# Patient Record
Sex: FEMALE | Race: BLACK OR AFRICAN AMERICAN | NOT HISPANIC OR LATINO | Employment: UNEMPLOYED | URBAN - METROPOLITAN AREA
[De-identification: names, ages, dates, MRNs, and addresses within clinical notes are randomized per-mention and may not be internally consistent; named-entity substitution may affect disease eponyms.]

---

## 2020-01-06 ENCOUNTER — OFFICE VISIT (OUTPATIENT)
Dept: FAMILY MEDICINE CLINIC | Facility: CLINIC | Age: 45
End: 2020-01-06
Payer: COMMERCIAL

## 2020-01-06 VITALS
HEART RATE: 81 BPM | SYSTOLIC BLOOD PRESSURE: 158 MMHG | OXYGEN SATURATION: 100 % | BODY MASS INDEX: 19.44 KG/M2 | DIASTOLIC BLOOD PRESSURE: 94 MMHG | HEIGHT: 66 IN | WEIGHT: 121 LBS

## 2020-01-06 DIAGNOSIS — Z72.0 NICOTINE ABUSE: ICD-10-CM

## 2020-01-06 DIAGNOSIS — Z82.49 FAMILY HISTORY OF MI (MYOCARDIAL INFARCTION): ICD-10-CM

## 2020-01-06 DIAGNOSIS — Z23 NEED FOR INFLUENZA VACCINATION: ICD-10-CM

## 2020-01-06 DIAGNOSIS — Z76.89 ENCOUNTER TO ESTABLISH CARE WITH NEW DOCTOR: Primary | ICD-10-CM

## 2020-01-06 DIAGNOSIS — E28.319 EARLY MENOPAUSE OCCURRING IN PATIENT AGE YOUNGER THAN 45 YEARS: ICD-10-CM

## 2020-01-06 DIAGNOSIS — Z80.0 FAMILY HISTORY OF COLON CANCER REQUIRING SCREENING COLONOSCOPY: ICD-10-CM

## 2020-01-06 DIAGNOSIS — R03.0 ELEVATED BLOOD PRESSURE READING: ICD-10-CM

## 2020-01-06 DIAGNOSIS — Z80.3 FAMILY HISTORY OF BREAST CANCER: ICD-10-CM

## 2020-01-06 PROCEDURE — 90682 RIV4 VACC RECOMBINANT DNA IM: CPT | Performed by: FAMILY MEDICINE

## 2020-01-06 PROCEDURE — 90471 IMMUNIZATION ADMIN: CPT | Performed by: FAMILY MEDICINE

## 2020-01-06 PROCEDURE — 99203 OFFICE O/P NEW LOW 30 MIN: CPT | Performed by: FAMILY MEDICINE

## 2020-01-07 NOTE — PROGRESS NOTES
Assessment/Plan:  1  Encounter to establish care with new doctor  Hemoglobin A1C    TSH + Free T4    Hemoglobin A1C    TSH + Free T4   2  Family history of colon cancer requiring screening colonoscopy  Ambulatory referral to Gastroenterology   3  Family history of MI (myocardial infarction)  CBC and differential    Comprehensive metabolic panel    Hemoglobin A1C    Lipid panel    CBC and differential    Comprehensive metabolic panel    Hemoglobin A1C    Lipid panel   4  Elevated blood pressure reading  Comprehensive metabolic panel    Hemoglobin A1C    TSH + Free T4    Lipid panel    Comprehensive metabolic panel    Hemoglobin A1C    TSH + Free T4    Lipid panel   5  Early menopause occurring in patient age younger than 39 years     10  Nicotine abuse  nicotine (NICODERM CQ) 7 mg/24hr TD 24 hr patch   7  Family history of breast cancer       Establishment of new care  - RTO in 1 year for yearly physical  -CBC, CMP, lipid, TSH, A1c, future  - flu shot today    Elevated blood pressure reading  - /94 in office today  - RTO in 2 weeks for repeat BP measurement  - consider AA-friendly medication such as HCTZ or CCB  - CMP, lipid panel, TSH, future    Family history of colon cancer  - mother diagnosed with colon cancer in her 45s, harris currently reports no related symptoms including hematochezia or melena or unintentional weight loss  -referral to GI for colonoscopy    Nicotine abuse  -Nicoderm 7mg 24hr patch prescribed  - smoking cessation education provided including risks associated    Early menopause  -started last year when she was 38 y/o    Family history of breast cancer  - continue with self breast exams monthly  - will speak to Heme/Onc for genetic screening      Subjective:      Patient ID: Giselle Medina is a 40 y o  female  HPI  Patient is a 39 y/o female with history of early menopause presenting to establish care   Patient was recently seen in the office for the care of her mother and herself was noted to have a significantly elevated BP with a systolic in the 775T  She states that she was told to go to the ER but did not and instead made a decision to establish care at [facility] today  Patient currently denies any headache, dizziness, vision changes, chest pain, palpitations, SOB, abdominal pain, or unintentional weight loss  She admits that she is interested in quitting smoking  Per patient, pap smear was normal 3 years ago per patient  Performs monthly self breast exams  Stopped menses a year ago and states that she was having hot flashes and was told by [facility] that she has menopause  [address]: mother: COPD, HTN, DM, CAD, asthma, hx of colon cancer (her mother was in her 45s), sister passed away due to breast CA, father death from MI  Social: 1 pack per week for 22 years  Occasional EtOH use  Denies use of illicit drugs  Hospitalizations: none  Surgery: 3 C-sections    Review of Systems   Constitutional: Negative for chills and fever  Eyes: Negative for visual disturbance  Respiratory: Negative for chest tightness and shortness of breath  Cardiovascular: Negative for chest pain and palpitations  Gastrointestinal: Negative for abdominal pain, constipation, diarrhea and nausea  Neurological: Negative for headaches  Objective:      /94   Pulse 81   Ht 5' 5 5" (1 664 m)   Wt 54 9 kg (121 lb)   SpO2 100%   BMI 19 83 kg/m²          Physical Exam   Constitutional: She is oriented to person, place, and time  She appears well-developed and well-nourished  No distress  HENT:   Head: Normocephalic and atraumatic  Right Ear: External ear normal    Left Ear: External ear normal    Mouth/Throat: Oropharynx is clear and moist  No oropharyngeal exudate  Eyes: Pupils are equal, round, and reactive to light  Conjunctivae and EOM are normal    Neck: Normal range of motion  Neck supple  No tracheal deviation present     Cardiovascular: Normal rate, regular rhythm, normal heart sounds and intact distal pulses  Pulmonary/Chest: Effort normal and breath sounds normal  No respiratory distress  Right breast exhibits no inverted nipple, no mass, no nipple discharge, no skin change and no tenderness  Left breast exhibits no inverted nipple, no mass, no nipple discharge, no skin change and no tenderness  No breast swelling  Abdominal: Soft  Bowel sounds are normal  She exhibits no distension  There is no tenderness  There is no rebound and no guarding  Musculoskeletal: Normal range of motion  She exhibits no edema, tenderness or deformity  Lymphadenopathy:     She has no cervical adenopathy  Neurological: She is alert and oriented to person, place, and time  No cranial nerve deficit or sensory deficit  She exhibits normal muscle tone  Coordination normal    Skin: Skin is warm and dry  She is not diaphoretic  Psychiatric: She has a normal mood and affect  Her behavior is normal  Judgment and thought content normal    Nursing note and vitals reviewed  --  Tonya Daniel DO    "This note has been constructed using a voice recognition system  Therefore there may be syntax, spelling, and/or grammatical errors   Please call if you have any questions "

## 2020-01-20 ENCOUNTER — OFFICE VISIT (OUTPATIENT)
Dept: FAMILY MEDICINE CLINIC | Facility: CLINIC | Age: 45
End: 2020-01-20
Payer: COMMERCIAL

## 2020-01-20 VITALS
DIASTOLIC BLOOD PRESSURE: 100 MMHG | HEART RATE: 81 BPM | BODY MASS INDEX: 19.44 KG/M2 | SYSTOLIC BLOOD PRESSURE: 192 MMHG | RESPIRATION RATE: 18 BRPM | WEIGHT: 121 LBS | HEIGHT: 66 IN | OXYGEN SATURATION: 98 %

## 2020-01-20 DIAGNOSIS — Z23 NEED FOR TDAP VACCINATION: ICD-10-CM

## 2020-01-20 DIAGNOSIS — Z12.39 SCREENING FOR BREAST CANCER: ICD-10-CM

## 2020-01-20 DIAGNOSIS — I10 HYPERTENSION: Primary | ICD-10-CM

## 2020-01-20 PROCEDURE — 3080F DIAST BP >= 90 MM HG: CPT | Performed by: FAMILY MEDICINE

## 2020-01-20 PROCEDURE — 90471 IMMUNIZATION ADMIN: CPT | Performed by: FAMILY MEDICINE

## 2020-01-20 PROCEDURE — 90715 TDAP VACCINE 7 YRS/> IM: CPT | Performed by: FAMILY MEDICINE

## 2020-01-20 PROCEDURE — 3077F SYST BP >= 140 MM HG: CPT | Performed by: FAMILY MEDICINE

## 2020-01-20 PROCEDURE — 99213 OFFICE O/P EST LOW 20 MIN: CPT | Performed by: FAMILY MEDICINE

## 2020-01-20 PROCEDURE — 3008F BODY MASS INDEX DOCD: CPT | Performed by: FAMILY MEDICINE

## 2020-01-20 RX ORDER — AMLODIPINE BESYLATE 2.5 MG/1
2.5 TABLET ORAL DAILY
Qty: 30 TABLET | Refills: 3 | Status: SHIPPED | OUTPATIENT
Start: 2020-01-20 | End: 2022-05-19 | Stop reason: SDUPTHER

## 2020-01-21 NOTE — PROGRESS NOTES
Assessment/Plan:    1  Hypertension   - will start the patient on amlodipine 2 5 mg po q d    - patient advised on smoking cessation  - diet modification with low sodium content  - patient was counseled on the risks of having uncontrolled hypertension with concurrent smoking, including stroke and/or death  - patient advised to seek emergent care if any of the warning symptoms develop (chest pain, SOB, headache, vision changes, LOC or changes in mental status)    2  Screening for breast cancer  - referral for mammogram made    3  Need for Tdap booster  - booster administered today     RTO in 1 month  Subjective:      Patient ID: Akil Huffman is a 40 y o  female  HPI    Patient is a 40 y o  f with significant smokign history presenting for high blood pressure follow up  Two weeks ago the BP was 158/97 in office and today it is markedly elevated at 192/100  Patient currently denies any symptoms including headache, vision changes, chest pain, or shortness of breath  Patient admits to still smoking and states that she never picked up the nicotine patches that was prescribed during the previous visit  Patient also states that she eat diet very high in salt content  Patient describes a significant family history of hypertension and stroke in the first degree family members  Review of Systems    See above    Objective:      BP (!) 192/100   Pulse 81   Resp 18   Ht 5' 5 5" (1 664 m)   Wt 54 9 kg (121 lb)   SpO2 98%   BMI 19 83 kg/m²          Physical Exam   Constitutional: She appears well-developed and well-nourished  No distress  HENT:   Head: Normocephalic and atraumatic  Eyes: EOM are normal    Cardiovascular: Normal rate, regular rhythm, normal heart sounds and intact distal pulses  No murmur heard  Pulmonary/Chest: Effort normal and breath sounds normal  No respiratory distress  Abdominal: Soft  Bowel sounds are normal  There is no tenderness  Skin: Skin is warm and dry   She is not diaphoretic  Psychiatric: She has a normal mood and affect  Her behavior is normal  Thought content normal    Nursing note and vitals reviewed  --  Nancy Peguero,     "This note has been constructed using a voice recognition system  Therefore there may be syntax, spelling, and/or grammatical errors   Please call if you have any questions "

## 2020-01-26 LAB
ALBUMIN SERPL-MCNC: 4.8 G/DL (ref 3.8–4.8)
ALBUMIN/GLOB SERPL: 2 {RATIO} (ref 1.2–2.2)
ALP SERPL-CCNC: 92 IU/L (ref 39–117)
ALT SERPL-CCNC: 19 IU/L (ref 0–32)
AST SERPL-CCNC: 22 IU/L (ref 0–40)
BASOPHILS # BLD AUTO: 0.1 X10E3/UL (ref 0–0.2)
BASOPHILS NFR BLD AUTO: 1 %
BILIRUB SERPL-MCNC: 1.1 MG/DL (ref 0–1.2)
BUN SERPL-MCNC: 13 MG/DL (ref 6–24)
BUN/CREAT SERPL: 13 (ref 9–23)
CALCIUM SERPL-MCNC: 9.4 MG/DL (ref 8.7–10.2)
CHLORIDE SERPL-SCNC: 97 MMOL/L (ref 96–106)
CHOLEST SERPL-MCNC: 223 MG/DL (ref 100–199)
CHOLEST/HDLC SERPL: 1.9 RATIO (ref 0–4.4)
CO2 SERPL-SCNC: 22 MMOL/L (ref 20–29)
CREAT SERPL-MCNC: 0.97 MG/DL (ref 0.57–1)
EOSINOPHIL # BLD AUTO: 0.1 X10E3/UL (ref 0–0.4)
EOSINOPHIL NFR BLD AUTO: 1 %
ERYTHROCYTE [DISTWIDTH] IN BLOOD BY AUTOMATED COUNT: 12.7 % (ref 11.7–15.4)
EST. AVERAGE GLUCOSE BLD GHB EST-MCNC: 91 MG/DL
GLOBULIN SER-MCNC: 2.4 G/DL (ref 1.5–4.5)
GLUCOSE SERPL-MCNC: 88 MG/DL (ref 65–99)
HBA1C MFR BLD: 4.8 % (ref 4.8–5.6)
HCT VFR BLD AUTO: 46.2 % (ref 34–46.6)
HDLC SERPL-MCNC: 120 MG/DL
HGB BLD-MCNC: 15.6 G/DL (ref 11.1–15.9)
IMM GRANULOCYTES # BLD: 0 X10E3/UL (ref 0–0.1)
IMM GRANULOCYTES NFR BLD: 0 %
LDLC SERPL CALC-MCNC: 92 MG/DL (ref 0–99)
LYMPHOCYTES # BLD AUTO: 2 X10E3/UL (ref 0.7–3.1)
LYMPHOCYTES NFR BLD AUTO: 35 %
MCH RBC QN AUTO: 32.7 PG (ref 26.6–33)
MCHC RBC AUTO-ENTMCNC: 33.8 G/DL (ref 31.5–35.7)
MCV RBC AUTO: 97 FL (ref 79–97)
MONOCYTES # BLD AUTO: 0.7 X10E3/UL (ref 0.1–0.9)
MONOCYTES NFR BLD AUTO: 11 %
NEUTROPHILS # BLD AUTO: 3.1 X10E3/UL (ref 1.4–7)
NEUTROPHILS NFR BLD AUTO: 52 %
PLATELET # BLD AUTO: 272 X10E3/UL (ref 150–450)
POTASSIUM SERPL-SCNC: 4.2 MMOL/L (ref 3.5–5.2)
PROT SERPL-MCNC: 7.2 G/DL (ref 6–8.5)
RBC # BLD AUTO: 4.77 X10E6/UL (ref 3.77–5.28)
SL AMB EGFR AFRICAN AMERICAN: 82 ML/MIN/1.73
SL AMB EGFR NON AFRICAN AMERICAN: 71 ML/MIN/1.73
SL AMB VLDL CHOLESTEROL CALC: 11 MG/DL (ref 5–40)
SODIUM SERPL-SCNC: 139 MMOL/L (ref 134–144)
T4 FREE SERPL DIALY-MCNC: 1.1 NG/DL
TRIGL SERPL-MCNC: 53 MG/DL (ref 0–149)
TSH SERPL-ACNC: 0.82 UU/ML
WBC # BLD AUTO: 5.9 X10E3/UL (ref 3.4–10.8)

## 2020-01-26 PROCEDURE — 3044F HG A1C LEVEL LT 7.0%: CPT | Performed by: FAMILY MEDICINE

## 2021-08-03 ENCOUNTER — HOSPITAL ENCOUNTER (EMERGENCY)
Facility: HOSPITAL | Age: 46
Discharge: HOME | End: 2021-08-03
Attending: EMERGENCY MEDICINE | Admitting: EMERGENCY MEDICINE
Payer: COMMERCIAL

## 2021-08-03 VITALS
HEIGHT: 65 IN | HEART RATE: 85 BPM | DIASTOLIC BLOOD PRESSURE: 112 MMHG | OXYGEN SATURATION: 99 % | RESPIRATION RATE: 16 BRPM | WEIGHT: 107 LBS | TEMPERATURE: 98.6 F | BODY MASS INDEX: 17.83 KG/M2 | SYSTOLIC BLOOD PRESSURE: 219 MMHG

## 2021-08-03 DIAGNOSIS — T25.022A: Primary | ICD-10-CM

## 2021-08-03 DIAGNOSIS — T31.0 BURN (ANY DEGREE) INVOLVING LESS THAN 10% OF BODY SURFACE: ICD-10-CM

## 2021-08-03 DIAGNOSIS — I10 HYPERTENSION: ICD-10-CM

## 2021-08-03 PROCEDURE — 99283 EMERGENCY DEPT VISIT LOW MDM: CPT

## 2021-08-03 PROCEDURE — 99284 EMERGENCY DEPT VISIT MOD MDM: CPT | Performed by: PHYSICIAN ASSISTANT

## 2021-08-03 RX ORDER — GINSENG 100 MG
1 CAPSULE ORAL ONCE
Status: COMPLETED | OUTPATIENT
Start: 2021-08-03 | End: 2021-08-03

## 2021-08-03 RX ORDER — AMLODIPINE BESYLATE 5 MG/1
5 TABLET ORAL ONCE
Status: COMPLETED | OUTPATIENT
Start: 2021-08-03 | End: 2021-08-03

## 2021-08-03 RX ORDER — GINSENG 100 MG
1 CAPSULE ORAL 2 TIMES DAILY
Qty: 28 G | Refills: 0 | Status: SHIPPED | OUTPATIENT
Start: 2021-08-03

## 2021-08-03 RX ADMIN — BACITRACIN ZINC 1 LARGE APPLICATION: 500 OINTMENT TOPICAL at 09:31

## 2021-08-03 RX ADMIN — AMLODIPINE BESYLATE 5 MG: 5 TABLET ORAL at 09:31

## 2021-08-03 NOTE — ED PROVIDER NOTES
History  Chief Complaint   Patient presents with    Burn     pt spilled grease on left foot sunday, open wound present      Patient is a 54 y/o female with a PMHx of hypertension, presenting to the ED for evaluation of a burn to left foot that occurred 2 days ago  Patient was cooking and accidentally spilled hot grease on her left foot  She later noticed that the skin was blistering and says 2 of the blisters have opened up since that time and she does not have the supplies to clean it and dress it at home  Patient noted to have elevated blood pressures and states that she has not been taking her Norvasc for a few weeks because she forgets  She is asymptomatic and denies headache, visual changes, dizziness, chest pain, SOB, palpitations, abdominal pain or N/V  Prior to Admission Medications   Prescriptions Last Dose Informant Patient Reported? Taking? amLODIPine (NORVASC) 2 5 mg tablet   No No   Sig: Take 1 tablet (2 5 mg total) by mouth daily   nicotine (NICODERM CQ) 7 mg/24hr TD 24 hr patch   No No   Sig: Place 1 patch on the skin every 24 hours      Facility-Administered Medications: None       Past Medical History:   Diagnosis Date    Hypertension        History reviewed  No pertinent surgical history  Family History   Problem Relation Age of Onset    Cancer Mother     Asthma Mother     Diabetes Mother      I have reviewed and agree with the history as documented  E-Cigarette/Vaping     E-Cigarette/Vaping Substances     Social History     Tobacco Use    Smoking status: Light Tobacco Smoker    Smokeless tobacco: Never Used    Tobacco comment: 3 per day   Substance Use Topics    Alcohol use: Not on file    Drug use: Not on file       Review of Systems   Constitutional: Negative for appetite change, chills, fatigue and fever  HENT: Negative for congestion, rhinorrhea, sinus pressure, sinus pain and sore throat  Eyes: Negative for photophobia and visual disturbance     Respiratory: Negative for cough, shortness of breath and wheezing  Cardiovascular: Negative for chest pain, palpitations and leg swelling  Gastrointestinal: Negative for abdominal pain, blood in stool, constipation, diarrhea, nausea and vomiting  Genitourinary: Negative for difficulty urinating, dysuria, flank pain, frequency, hematuria and urgency  Musculoskeletal: Negative for arthralgias, back pain, joint swelling, myalgias and neck pain  Skin: Positive for wound (partial thickness burn, left foot)  Neurological: Negative for dizziness, syncope, weakness, light-headedness and headaches  All other systems reviewed and are negative  Physical Exam  Physical Exam  Vitals and nursing note reviewed  Constitutional:       General: She is awake  Appearance: Normal appearance  She is well-developed  She is not toxic-appearing or diaphoretic  HENT:      Head: Normocephalic and atraumatic  Right Ear: External ear normal       Left Ear: External ear normal       Nose: Nose normal       Mouth/Throat:      Lips: Pink  Mouth: Mucous membranes are moist    Eyes:      General: Lids are normal  No scleral icterus  Conjunctiva/sclera: Conjunctivae normal       Pupils: Pupils are equal, round, and reactive to light  Cardiovascular:      Rate and Rhythm: Normal rate and regular rhythm  Pulses: Normal pulses  Radial pulses are 2+ on the right side and 2+ on the left side  Heart sounds: Normal heart sounds, S1 normal and S2 normal    Pulmonary:      Effort: Pulmonary effort is normal  No accessory muscle usage  Breath sounds: Normal breath sounds  No stridor  No decreased breath sounds, wheezing, rhonchi or rales  Abdominal:      General: Abdomen is flat  Bowel sounds are normal  There is no distension  Palpations: Abdomen is soft  Tenderness: There is no abdominal tenderness  There is no right CVA tenderness, left CVA tenderness, guarding or rebound  Musculoskeletal:      Cervical back: Full passive range of motion without pain and neck supple  No signs of trauma  No pain with movement  Right lower leg: No edema  Left lower leg: No edema  Feet:    Lymphadenopathy:      Cervical: No cervical adenopathy  Skin:     General: Skin is warm and dry  Capillary Refill: Capillary refill takes less than 2 seconds  Coloration: Skin is not cyanotic, jaundiced or pale  Neurological:      Mental Status: She is alert and oriented to person, place, and time  GCS: GCS eye subscore is 4  GCS verbal subscore is 5  GCS motor subscore is 6  Gait: Gait normal    Psychiatric:         Mood and Affect: Mood normal          Speech: Speech normal          Behavior: Behavior is cooperative           Vital Signs  ED Triage Vitals   Temperature Pulse Respirations Blood Pressure SpO2   08/03/21 0848 08/03/21 0848 08/03/21 0848 08/03/21 0851 08/03/21 0848   98 6 °F (37 °C) 85 16 (!) 219/112 99 %      Temp Source Heart Rate Source Patient Position - Orthostatic VS BP Location FiO2 (%)   08/03/21 0848 08/03/21 0848 -- -- --   Oral Monitor         Pain Score       08/03/21 0848       No Pain           Vitals:    08/03/21 0848 08/03/21 0851   BP:  (!) 219/112   Pulse: 85          Visual Acuity      ED Medications  Medications   amLODIPine (NORVASC) tablet 5 mg (5 mg Oral Given 8/3/21 0931)   bacitracin topical ointment 1 large application (1 large application Topical Given 8/3/21 0931)       Diagnostic Studies  Results Reviewed     None                 No orders to display              Procedures  Procedures         ED Course                                           MDM  Number of Diagnoses or Management Options  Burn (any degree) involving less than 10% of body surface  Burn of left foot  Hypertension  Diagnosis management comments: Patient is a 56 y/o female with a PMHx of hypertension, presenting to the ED for evaluation of a burn to left foot that occurred 2 days ago  Wound was cleaned and I applied bacitracin and gauze/gauze roll  Patient given instructions for dressing changes twice daily  Burn center information provided for follow-up  Patient given dose of Norvasc in ED and I discussed the importance of taking blood pressure medications at home  Advised prompt follow-up with PCP  The management plan was discussed in detail with the patient at bedside and all questions were answered  Prior to discharge, verbal and written instructions provided  ED return precautions discussed in detail  The patient verbalized understanding of our discussion and plan of care, and agrees to return to the Emergency Department for concerns and progression of illness  Patient Progress  Patient progress: stable      Disposition  Final diagnoses:   Burn of left foot   Burn (any degree) involving less than 10% of body surface   Hypertension     Time reflects when diagnosis was documented in both MDM as applicable and the Disposition within this note     Time User Action Codes Description Comment    8/3/2021  9:15 AM Deince Seymour Add [T25 022A] Burn of left foot     8/3/2021  9:15 AM Parvin Pedro Add [T31 0] Burn (any degree) involving less than 10% of body surface     8/3/2021  9:17 AM Parvin Pedro Add [R03 0] Elevated blood pressure reading     8/3/2021  9:17 AM Parvin Pedro Remove [R03 0] Elevated blood pressure reading     8/3/2021  9:18 AM Denice Seymour Add [I10] Hypertension       ED Disposition     ED Disposition Condition Date/Time Comment    Discharge Stable Tue Aug 3, 2021  9:15 AM [facility] discharge to home/self care              Follow-up Information     Follow up With Specialties Details Why Contact Info Additional [place]  Schedule an appointment as soon as possible for a visit   [phone] Med Center  3rd [address] [facility] [address] [place] [address] Emergency Department Emergency Medicine  If symptoms worsen [address] [address] Emergency Department, [place], [id number]          Discharge Medication List as of 8/3/2021  9:37 AM      START taking these medications    Details   bacitracin topical ointment 500 units/g topical ointment Apply 1 large application topically 2 (two) times a day, Starting Tue 8/3/2021, Normal         CONTINUE these medications which have NOT CHANGED    Details   amLODIPine (NORVASC) 2 5 mg tablet Take 1 tablet (2 5 mg total) by mouth daily, Starting Mon 1/20/2020, Normal      nicotine (NICODERM CQ) 7 mg/24hr TD 24 hr patch Place 1 patch on the skin every 24 hours, Starting Mon 1/6/2020, Normal           No discharge procedures on file      PDMP Review     None          ED Provider  Electronically Signed by           Calli Peguero PA-C  08/04/21 2749

## 2022-01-24 ENCOUNTER — HOSPITAL ENCOUNTER (EMERGENCY)
Facility: HOSPITAL | Age: 47
Discharge: HOME | End: 2022-01-24
Attending: EMERGENCY MEDICINE
Payer: COMMERCIAL

## 2022-01-24 VITALS
SYSTOLIC BLOOD PRESSURE: 166 MMHG | OXYGEN SATURATION: 99 % | HEART RATE: 83 BPM | DIASTOLIC BLOOD PRESSURE: 88 MMHG | TEMPERATURE: 98.2 F | RESPIRATION RATE: 16 BRPM

## 2022-01-24 DIAGNOSIS — K64.9 HEMORRHOID: Primary | ICD-10-CM

## 2022-01-24 DIAGNOSIS — K62.9 PERIANAL LESION: ICD-10-CM

## 2022-01-24 LAB — RPR SER QL: NORMAL

## 2022-01-24 PROCEDURE — 87389 HIV-1 AG W/HIV-1&-2 AB AG IA: CPT | Performed by: EMERGENCY MEDICINE

## 2022-01-24 PROCEDURE — 99284 EMERGENCY DEPT VISIT MOD MDM: CPT

## 2022-01-24 PROCEDURE — 36415 COLL VENOUS BLD VENIPUNCTURE: CPT | Performed by: EMERGENCY MEDICINE

## 2022-01-24 PROCEDURE — 86592 SYPHILIS TEST NON-TREP QUAL: CPT | Performed by: EMERGENCY MEDICINE

## 2022-01-24 PROCEDURE — 99282 EMERGENCY DEPT VISIT SF MDM: CPT | Performed by: EMERGENCY MEDICINE

## 2022-01-24 RX ORDER — DIAPER,BRIEF,INFANT-TODD,DISP
EACH MISCELLANEOUS
Qty: 15 G | Refills: 0 | Status: SHIPPED | OUTPATIENT
Start: 2022-01-24

## 2022-01-24 NOTE — ED NOTES
TY called for ride home     [facility]  01/24/22 6386

## 2022-01-24 NOTE — ED PROVIDER NOTES
Final Diagnosis:  1  Hemorrhoid    2  Perianal lesion        Chief Complaint   Patient presents with    Vaginal Bleeding     Patient states, "I was having a few beers and thought I had to pee again but I wiped and my hand was covered in blood " States she may have hemorrhoids but this bleeding is coming from her vagina with occasional clots  States she went through menopause in 2016      HPI  70-year-old woman with a history of hypertension presents with some bright red blood when she was wiping  She does have hemorrhoids this is never happened to her before  She is concerned is coming from her vagina  This is concerning for her as she has gone through menopause comes 2016  She is not sexually active right now  She notes no other vaginal discharge at this time  On chaperoned exam she has a abrasion near her anus  She also has a hemorrhoid though it is not the source of bleeding  There is a hemostatic lesion there I suspect from excessive wiping  She has no blood no clots no lesions in her vaginal vault or cervix  - No language barrier    - History obtained from patient  - There are no limitations to the history obtained  - Previous charting underwent limited review with attention to last ED visits, labs, ekgs, and prior imaging  PMH:   has a past medical history of Hypertension  PSH:   has no past surgical history on file  Social History:  Recently moved from elsewhere    ROS:  Review of Systems   Constitutional: Negative for chills and fever  HENT: Negative for ear pain and sore throat  Eyes: Negative for pain and visual disturbance  Respiratory: Negative for cough and shortness of breath  Cardiovascular: Negative for chest pain and palpitations  Gastrointestinal: Negative for abdominal pain and vomiting  Genitourinary: Positive for vaginal bleeding  Negative for dysuria and hematuria  Musculoskeletal: Negative for arthralgias and back pain     Skin: Negative for color change and rash  Neurological: Negative for seizures and syncope  All other systems reviewed and are negative  PE:     Physical exam highlights:   Physical Exam       Vitals:    01/24/22 0312   BP: 166/88   BP Location: Left arm   Pulse: 83   Resp: 16   Temp: 98 2 °F (36 8 °C)   TempSrc: Oral   SpO2: 99%     Vitals reviewed by me  Nursing note reviewed  Chaperone present for all sensitive exam   Pelvic exam with normal white discharge in the vaginal wall cervix normal no bleeding clots pink discharge etcetera  Patient has 5:00  Perianal abrasion  She also has a 12:00 p m  Hemorrhoid external   Not thrombosed  No bleeding there  Const: No acute distress  Alert  Nontoxic  Not diaphoretic  HEENT: External ears normal  No protrusion drainage swelling  Nose normal  No drainage/traumatic deformity  MMM  Mouth with baseline/symmetric movement  No trismus  Eyes: No squinting  No icterus  Tracks through the room with normal EOM  No tearing/swelling/drainage  Neck: ROM normal  No rigidity  No meningismus  Cards: Rate as per vitals  Compared to monitor sinus unless documented above  Regular  Well perfused  Pulm: able to verbalize without additional effort  Effort and excursion normal  No disress  No audible wheezing/ stridor  Normal resp rate  Abd: No distension beyond baseline  No fluctuant wave  Patient without peritoneal pain with shifting/bumping the bed  MSK: ROM normal and baseline  No deformity  Skin: No new rashes visible  Well perfused  Neuro: Nonfocal  Baseline  CN grossly intact  Moving all four with coordination  Psych: Normal behavior and affect  A:  - Nursing note reviewed  Ddx and MDM  Anal abrasion  Cannot rule out that this is a atypical appearing ulcer  Will check RPR patient has this history of gonorrhea chlamydia  She declines GC testing she is not sexually active now but agrees to syphilis and HIV testing                        No orders to display     No orders of the defined types were placed in this encounter  Labs Reviewed - No data to display    Final Diagnosis:  1  Hemorrhoid    2  Perianal lesion        P:  - hospital tx includes reassurance  I encouraged Sitz baths wet wipes bidet  Medications - No data to display      - disposition  Time reflects when diagnosis was documented in both MDM as applicable and the Disposition within this note     Time User Action Codes Description Comment    1/24/2022  4:38 AM Alphia Gong Add [K64 9] Hemorrhoid     1/24/2022  4:38 AM Alphia Gong Add [K62 9] Perianal lesion       ED Disposition     ED Disposition Condition Date/Time Comment    Discharge Stable Mon Jan 24, 2022  4:37 AM [facility] discharge to home/self care  Follow-up Information     Follow up With Specialties Details Why Contact Info Additional Information    [facility] and Gynecology   [address] [place] [address],[address] [phone]  [facility] [place], [address] [place] [address], [id number] [phone]          - patient will call their PCP to let them know they were in the emergency department   We discuss return precautions       - additional tx intended, if consistent with primary provider:  - patient to follow with :      Discharge Medication List as of 1/24/2022  4:40 AM      START taking these medications    Details   hydrocortisone 1 % cream Apply to affected area 2 times daily, Normal      witch hazel-glycerin (TUCKS) topical pad Insert 1 pad into the rectum as needed for irritation, Starting Mon 1/24/2022, Normal         CONTINUE these medications which have NOT CHANGED    Details   amLODIPine (NORVASC) 2 5 mg tablet Take 1 tablet (2 5 mg total) by mouth daily, Starting Mon 1/20/2020, Normal      bacitracin topical ointment 500 units/g topical ointment Apply 1 large application topically 2 (two) times a day, Starting Tue 8/3/2021, Normal      nicotine (NICODERM CQ) 7 mg/24hr TD 24 hr patch Place 1 patch on the skin every 24 hours, Starting Mon 1/6/2020, Normal           No discharge procedures on file  Prior to Admission Medications   Prescriptions Last Dose Informant Patient Reported? Taking? amLODIPine (NORVASC) 2 5 mg tablet   No No   Sig: Take 1 tablet (2 5 mg total) by mouth daily   bacitracin topical ointment 500 units/g topical ointment   No No   Sig: Apply 1 large application topically 2 (two) times a day   nicotine (NICODERM CQ) 7 mg/24hr TD 24 hr patch   No No   Sig: Place 1 patch on the skin every 24 hours      Facility-Administered Medications: None       Portions of the record may have been created with voice recognition software  Occasional wrong word or "sound a like" substitutions may have occurred due to the inherent limitations of voice recognition software  Read the chart carefully and recognize, using context, where substitutions have occurred      Electronically signed by:  Kenard Mohs, MD Eulalio Dredge, MD  01/25/22 7592

## 2022-01-25 LAB — HIV 1+2 AB+HIV1 P24 AG SERPL QL IA: NORMAL

## 2022-05-19 ENCOUNTER — OFFICE VISIT (OUTPATIENT)
Dept: FAMILY MEDICINE CLINIC | Facility: CLINIC | Age: 47
End: 2022-05-19
Payer: COMMERCIAL

## 2022-05-19 VITALS
SYSTOLIC BLOOD PRESSURE: 190 MMHG | WEIGHT: 105.6 LBS | TEMPERATURE: 97.7 F | HEIGHT: 66 IN | HEART RATE: 86 BPM | BODY MASS INDEX: 16.97 KG/M2 | DIASTOLIC BLOOD PRESSURE: 120 MMHG | OXYGEN SATURATION: 99 % | RESPIRATION RATE: 16 BRPM

## 2022-05-19 DIAGNOSIS — R53.83 FATIGUE, UNSPECIFIED TYPE: ICD-10-CM

## 2022-05-19 DIAGNOSIS — Z80.0 FAMILY HX OF COLON CANCER REQUIRING SCREENING COLONOSCOPY: ICD-10-CM

## 2022-05-19 DIAGNOSIS — Z11.59 NEED FOR HEPATITIS C SCREENING TEST: ICD-10-CM

## 2022-05-19 DIAGNOSIS — I16.0 ASYMPTOMATIC HYPERTENSIVE URGENCY: ICD-10-CM

## 2022-05-19 DIAGNOSIS — Z00.00 PHYSICAL EXAM, ANNUAL: Primary | ICD-10-CM

## 2022-05-19 DIAGNOSIS — Z12.31 ENCOUNTER FOR SCREENING MAMMOGRAM FOR MALIGNANT NEOPLASM OF BREAST: ICD-10-CM

## 2022-05-19 PROCEDURE — 99396 PREV VISIT EST AGE 40-64: CPT | Performed by: FAMILY MEDICINE

## 2022-05-19 PROCEDURE — 3725F SCREEN DEPRESSION PERFORMED: CPT | Performed by: FAMILY MEDICINE

## 2022-05-19 PROCEDURE — 3008F BODY MASS INDEX DOCD: CPT | Performed by: FAMILY MEDICINE

## 2022-05-19 RX ORDER — AMLODIPINE BESYLATE 2.5 MG/1
5 TABLET ORAL DAILY
Qty: 30 TABLET | Refills: 3 | Status: SHIPPED | OUTPATIENT
Start: 2022-05-19

## 2022-05-19 RX ORDER — CHLORTHALIDONE 25 MG/1
12.5 TABLET ORAL DAILY
Qty: 30 TABLET | Refills: 5 | Status: SHIPPED | OUTPATIENT
Start: 2022-05-19

## 2022-05-19 NOTE — PROGRESS NOTES
Assessment/Plan:       Diagnoses and all orders for this visit:    Physical exam, annual    Asymptomatic hypertensive urgency  · 59-year-old female who presented today for her CPE, and was noted with markedly elevated  Patient presented today with a blood pressure of 190/120 remeasured at 185/110  Patient was previously on amlodipine 2 5 mg but has not been taking them for a while  Patient at this time denies any headache or chest pain  · Patient will be started on chlorthalidone 12 5 mg daily, and amlodipine will be increased from 2 5 mg to 5 mg daily  · Patient at this time has been counseled on lifestyle modification through regular exercise at least 3-5 times a week lasting at least 30 minutes per session  Patient has also been advised on diet rich in fruits and vegetables  · Patient has also been urged to complete outstanding blood work  Patient conveys understanding  · Patient with return in 1 week at which point his blood pressure will be re-evaluated  -     amLODIPine (NORVASC) 2 5 mg tablet; Take 2 tablets (5 mg total) by mouth in the morning   -     chlorthalidone 25 mg tablet; Take 0 5 tablets (12 5 mg total) by mouth in the morning   -     Microalbumin / creatinine urine ratio  -     TSH, 3rd generation with Free T4 reflex; Future  -     TSH, 3rd generation with Free T4 reflex    Encounter for screening mammogram for malignant neoplasm of breast  -     Mammo screening bilateral w cad; Future    Fatigue, unspecified type  -     CBC and Platelet; Future  -     Comprehensive metabolic panel; Future  -     CBC and Platelet  -     Comprehensive metabolic panel    Need for hepatitis C screening test  -     Hepatitis C antibody; Future  -     Hepatitis C antibody    Family hx of colon cancer requiring screening colonoscopy  -     Ambulatory referral for colonoscopy;  Future    BMI less than 19,adult  · Patient encouraged on adequate nutrition, such as increasing intake of high-calorie meals (balanced) Subjective:      Patient ID: Edelmira Cobian is a 55 y o  female  Immunizations and preventive care screenings were discussed with patient today  Appropriate education was printed on patient's after visit summary  Counseling:  Alcohol/drug use: discussed moderation in alcohol intake, the recommendations for healthy alcohol use, and avoidance of illicit drug use  Dental Health: discussed importance of regular tooth brushing, flossing, and dental visits  Injury prevention: discussed safety/seat belts, safety helmets, smoke detectors, carbon dioxide detectors, and smoking near bedding or upholstery  Sexual health: discussed sexually transmitted diseases, partner selection, use of condoms, avoidance of unintended pregnancy, and contraceptive alternatives  Exercise: the importance of regular exercise/physical activity was discussed  Recommend exercise 3-5 times per week for at least 30 minutes  Adult Annual Physical   Patient here for a comprehensive physical exam  The patient reports no problems  Diet and Physical Activity  Diet/Nutrition: limited junk food and consuming 3-5 servings of fruits/vegetables daily  Exercise: walking and 5-7 times a week on average  Depression Screening  PHQ-2/9 Depression Screening       General Health  Sleep: gets 4-6 hours of sleep on average  Hearing: normal - bilateral   Vision: vision problems: far vision  Dental: brushes teeth once daily, flosses teeth occasionally  Patient has an upcoming appointment for dental visits in August 22       /GYN Health  Patient is: postmenopausal  Last menstrual period:  3 years ago  Contraceptive method: none  Hypertension  This is a chronic problem  The current episode started more than 1 year ago  The problem has been gradually worsening since onset  The problem is uncontrolled  Pertinent negatives include no anxiety, blurred vision, chest pain, headaches, orthopnea, palpitations or shortness of breath  There are no associated agents (Denies use of illicit drugs) to hypertension  Risk factors for coronary artery disease include post-menopausal state  Past treatments include calcium channel blockers  The current treatment provides no improvement  Compliance problems: Patient is not compliant with medication use  The following portions of the patient's history were reviewed and updated as appropriate:   She  has a past medical history of Hypertension  She There are no problems to display for this patient  She  has no past surgical history on file  Her family history includes Asthma in her mother; Cancer in her mother; Diabetes in her mother  She  reports that she has been smoking  She has never used smokeless tobacco  She reports current alcohol use  She reports current drug use  Drug: Marijuana  Current Outpatient Medications   Medication Sig Dispense Refill    amLODIPine (NORVASC) 2 5 mg tablet Take 2 tablets (5 mg total) by mouth in the morning  30 tablet 3    chlorthalidone 25 mg tablet Take 0 5 tablets (12 5 mg total) by mouth in the morning  30 tablet 5    bacitracin topical ointment 500 units/g topical ointment Apply 1 large application topically 2 (two) times a day 28 g 0    hydrocortisone 1 % cream Apply to affected area 2 times daily 15 g 0    nicotine (NICODERM CQ) 7 mg/24hr TD 24 hr patch Place 1 patch on the skin every 24 hours (Patient not taking: Reported on 5/19/2022) 28 patch 0    witch hazel-glycerin (TUCKS) topical pad Insert 1 pad into the rectum as needed for irritation (Patient not taking: Reported on 5/19/2022) 20 each 0     No current facility-administered medications for this visit       Current Outpatient Medications on File Prior to Visit   Medication Sig    bacitracin topical ointment 500 units/g topical ointment Apply 1 large application topically 2 (two) times a day    hydrocortisone 1 % cream Apply to affected area 2 times daily    nicotine (NICODERM CQ) 7 mg/24hr TD 24 hr patch Place 1 patch on the skin every 24 hours (Patient not taking: Reported on 5/19/2022)    witch hazel-glycerin (TUCKS) topical pad Insert 1 pad into the rectum as needed for irritation (Patient not taking: Reported on 5/19/2022)    [DISCONTINUED] amLODIPine (NORVASC) 2 5 mg tablet Take 1 tablet (2 5 mg total) by mouth daily (Patient not taking: Reported on 5/19/2022)     No current facility-administered medications on file prior to visit  She has No Known Allergies       Review of Systems   Constitutional: Positive for fatigue  HENT: Negative  Eyes: Negative for blurred vision and visual disturbance  Respiratory: Negative for shortness of breath  Cardiovascular: Negative  Negative for chest pain, palpitations and orthopnea  Gastrointestinal: Negative  Genitourinary: Negative  Musculoskeletal: Negative  Neurological: Negative  Negative for headaches  Objective:      BP (!) 190/120 (BP Location: Left arm, Patient Position: Sitting, Cuff Size: Standard)   Pulse 86   Temp 97 7 °F (36 5 °C) (Tympanic)   Resp 16   Ht 5' 6" (1 676 m)   Wt 47 9 kg (105 lb 9 6 oz)   SpO2 99%   BMI 17 04 kg/m²          Physical Exam  Vitals reviewed  Constitutional:       Appearance: Normal appearance  Comments: Underweight   HENT:      Head: Normocephalic and atraumatic  Right Ear: External ear normal       Left Ear: External ear normal       Nose: Nose normal       Mouth/Throat:      Mouth: Mucous membranes are moist       Pharynx: Oropharynx is clear  No oropharyngeal exudate or posterior oropharyngeal erythema  Eyes:      Extraocular Movements: Extraocular movements intact  Conjunctiva/sclera: Conjunctivae normal       Pupils: Pupils are equal, round, and reactive to light  Cardiovascular:      Rate and Rhythm: Normal rate and regular rhythm  Pulses: Normal pulses  Heart sounds: Normal heart sounds  No murmur heard  No friction rub  No gallop  Pulmonary:      Effort: Pulmonary effort is normal       Breath sounds: Normal breath sounds  No wheezing, rhonchi or rales  Abdominal:      General: Abdomen is flat  Bowel sounds are normal  There is no distension  Palpations: Abdomen is soft  Tenderness: There is no abdominal tenderness  Hernia: No hernia is present  Musculoskeletal:         General: No swelling or tenderness  Normal range of motion  Cervical back: Normal range of motion  No rigidity or tenderness  Right lower leg: No edema  Left lower leg: No edema  Skin:     General: Skin is warm  Neurological:      Mental Status: She is alert and oriented to person, place, and time  Mental status is at baseline

## 2022-05-19 NOTE — PATIENT INSTRUCTIONS
Chronic Hypertension   AMBULATORY CARE:   Hypertension  is high blood pressure  Your blood pressure is the force of your blood moving against the walls of your arteries  Hypertension causes your blood pressure to get so high that your heart has to work much harder than normal  This can damage your heart  Even if you have hypertension for years, lifestyle changes, medicines, or both can help bring your blood pressure to normal   Call your local emergency number (911 in the [address] [facility] [address]) or have someone call if:   You have chest pain  You have any of the following signs of a heart attack:      Squeezing, pressure, or pain in your chest    You may  also have any of the following:     Discomfort or pain in your back, neck, jaw, stomach, or arm    Shortness of breath    Nausea or vomiting    Lightheadedness or a sudden cold sweat    You become confused or have difficulty speaking  You suddenly feel lightheaded or have trouble breathing  Seek care immediately if:   You have a severe headache or vision loss  You have weakness in an arm or leg  Call your doctor or cardiologist if:   You feel faint, dizzy, confused, or drowsy  You have been taking your blood pressure medicine but your pressure is higher than your provider says it should be  You have questions or concerns about your condition or care  Treatment for chronic hypertension  may include medicine to lower your blood pressure and cholesterol levels  A low cholesterol level helps prevent heart disease and makes it easier to control your blood pressure  Heart disease can make your blood pressure harder to control  You may also need to make lifestyle changes  What you need to know about the stages of hypertension:       Normal blood pressure is 119/79 or lower   Your healthcare provider may only check your blood pressure each year if it stays at a normal level  Elevated blood pressure is 120/79 to 129/79   This is sometimes called prehypertension  Your healthcare provider may suggest lifestyle changes to help lower your blood pressure to a normal level  He or she may then check it again in 3 to 6 months  Stage 1 hypertension is 130/80  to 139/89   Your provider may recommend lifestyle changes, medication, and checks every 3 to 6 months until your blood pressure is controlled  Stage 2 hypertension is 140/90 or higher   Your provider will recommend lifestyle changes and have you take 2 kinds of hypertension medicines  You will also need to have your blood pressure checked monthly until it is controlled  Manage chronic hypertension:   Check your blood pressure at home  Avoid smoking, caffeine, and exercise at least 30 minutes before checking your blood pressure  Sit and rest for 5 minutes before you take your blood pressure  Extend your arm and support it on a flat surface  Your arm should be at the same level as your heart  Follow the directions that came with your blood pressure monitor  Check your blood pressure 2 times, 1 minute apart, before you take your medicine in the morning  Also check your blood pressure before your evening meal  Keep a record of your readings and bring it to your follow-up visits  Ask your healthcare provider what your blood pressure should be  Manage any other health conditions you have  Health conditions such as diabetes can increase your risk for hypertension  Follow your healthcare provider's instructions and take all your medicines as directed  Talk to your healthcare provider about any new health conditions you have recently developed  Ask about all medicines  Certain medicines can increase your blood pressure  Examples include oral birth control pills, decongestants, herbal supplements, and NSAIDs, such as ibuprofen  Your healthcare provider can tell you which medicines are safe for you to take  This includes prescription and over-the-counter medicines      Lifestyle changes you can make to lower your blood pressure: Your provider may want you to make more lifestyle changes if you are having trouble controlling your blood pressure  This may feel difficult over time, especially if you think you are making good changes but your pressure is still high  It might help to focus on one new change at a time  For example, try to add 1 more day of exercise, or exercise for an extra 10 minutes on 2 days  Small changes can make a big difference  Your healthcare provider can also refer you to specialists such as a dietitian who can help you make small changes  Your family members may be included in helping you learn to create lifestyle changes, such as the following:     Limit sodium (salt) as directed  Too much sodium can affect your fluid balance  Check labels to find low-sodium or no-salt-added foods  Some low-sodium foods use potassium salts for flavor  Too much potassium can also cause health problems  Your healthcare provider will tell you how much sodium and potassium are safe for you to have in a day  He or she may recommend that you limit sodium to 2,300 mg a day  Follow the meal plan recommended by your healthcare provider  A dietitian or your provider can give you more information on low-sodium plans or the DASH (Dietary Approaches to Stop Hypertension) eating plan  The DASH plan is low in sodium, processed sugar, unhealthy fats, and total fat  It is high in potassium, calcium, and fiber  These can be found in vegetables, fruit, and whole-grain foods  Be physically active throughout the day  Physical activity, such as exercise, can help control your blood pressure and your weight  Be physically active for at least 30 minutes per day, on most days of the week  Include aerobic activity, such as walking or riding a bicycle  Also include strength training at least 2 times each week  Your healthcare providers can help you create a physical activity plan  Decrease stress    This may help lower your blood pressure  Learn ways to relax, such as deep breathing or listening to music  Limit alcohol as directed  Alcohol can increase your blood pressure  A drink of alcohol is 12 ounces of beer, 5 ounces of wine, or 1½ ounces of liquor  Do not smoke  Nicotine and other chemicals in cigarettes and cigars can increase your blood pressure and also cause lung damage  Ask your healthcare provider for information if you currently smoke and need help to quit  E-cigarettes or smokeless tobacco still contain nicotine  Talk to your healthcare provider before you use these products  Follow up with your doctor or cardiologist as directed: You will need to return to have your blood pressure checked and to have other lab tests done  Write down your questions so you remember to ask them during your visits  © Copyright [employer] 2022 Information is for End User's use only and may not be sold, redistributed or otherwise used for commercial purposes  All illustrations and images included in CareNotes® are the copyrighted property of A D A M , Inc  or [facility] [employer]  The above information is an  only  It is not intended as medical advice for individual conditions or treatments  Talk to your doctor, nurse or pharmacist before following any medical regimen to see if it is safe and effective for you  DASH Eating Plan   WHAT YOU NEED TO KNOW:   The DASH (Dietary Approaches to Stop Hypertension) Eating Plan is designed to help prevent or lower high blood pressure  It can also help to lower LDL (bad) cholesterol and decrease your risk for heart disease  The plan is low in sodium, sugar, unhealthy fats, and total fat  It is high in potassium, calcium, magnesium, and fiber  These nutrients are added when you eat more fruits, vegetables, and whole grains  With the DASH eating plan, you need to eat a certain number of servings from each food group   This will help you get enough of certain nutrients and limit others  The amount of servings you should eat depends on how many calories you need  Your dietitian can help you create meal plans with the right number of servings for each food group  DISCHARGE INSTRUCTIONS:   What you need to know about sodium:  Your dietitian will tell you how much sodium is safe for you to have each day  People with high blood pressure should have no more than 1,500 to 2,300 mg of sodium in a day  A teaspoon (tsp) of salt has 2,300 mg of sodium  This may seem like a difficult goal, but small changes to the foods you eat can make a big difference  Your healthcare provider or dietitian can help you create a meal plan that follows your sodium limit  Read food labels  Food labels can help you choose foods that are low in sodium  The amount of sodium is listed in milligrams (mg)  The % Daily Value (DV) column tells you how much of your daily needs are met by 1 serving of the food for each nutrient listed  Choose foods that have less than 5% of the DV of sodium  These foods are considered low in sodium  Foods that have 20% or more of the DV of sodium are considered high in sodium  Avoid foods that have more than 300 mg of sodium in each serving  Choose foods that say low-sodium, reduced-sodium, or no salt added on the food label  Limit added salt  Do not salt food at the table if you add salt when you cook  Use herbs and spices, such as onions, garlic, and salt-free seasonings to add flavor  Try lemon or lime juice or vinegar to add a tart flavor  Use hot peppers or a small amount of hot pepper sauce to add a spicy flavor   Limit foods high in added salt, such as the following:    Seasonings made with salt, such as garlic salt, celery salt, onion salt, seasoned salt, meat tenderizers, and monosodium glutamate (MSG)    Miso soup and canned or dried soup mixes    Regular soy sauce, barbecue sauce, teriyaki sauce, steak sauce, Worcestershire sauce, and most flavored vinegars    Snack foods, such as salted chips, popcorn, pretzels, pork rinds, salted crackers, and salted nuts    Frozen foods, such as dinners, entrees, vegetables with sauces, and breaded meats    Ask about salt substitutes  Ask your healthcare provider if you may use salt substitutes  Some salt substitutes have ingredients that can be harmful if you have certain health conditions  Choose foods carefully at restaurants  Meals from restaurants, especially fast food restaurants, are often high in sodium  Some restaurants have nutrition information that tells you the amount of sodium in their foods  Ask to have your food prepared with less, or no salt  What you need to know about fats:  Healthy fats include unsaturated fats and omega-3 fatty acids  Unhealthy fats include saturated fats and trans fats  Include healthy fats, such as the following:      Cooking oils, such as soybean, canola, olive, or sunflower    Fatty fish, such as salmon, tuna, mackerel, or sardines    Flaxseed oil or ground flaxseed    ½ cup of cooked beans, such as black beans, kidney beans, or enriquez beans    1½ ounces of low-sodium nuts, such as almonds or walnuts    Low-sugar, low-sodium peanut butter    Seeds such as gerardo seeds or sunflower seeds       Limit or do not have unhealthy fats, such as the following:      Foods that contain fat from animals, such as fatty meats, whole milk, butter, and cream    Shortening, stick margarine, palm oil, and coconut oil    Full-fat or creamy salad dressing    Creamy soup    Crackers, chips, and baked goods made with margarine or shortening    Foods that are fried in unhealthy fats    Gravy and sauces, such as Fabian or cheese sauces    What you need to know about carbohydrates (carbs): All carbs break down into sugar  Complex carbs contain more fiber than simple carbs  This means complex carbs go into the bloodstream more slowly and cause less of a blood sugar spike   Try to include more complex carbs and fewer simple carbs  Include complex carbs, such as the followin slice of whole-grain bread    1 ounce of dry cereal that does not contain added sugar    ½ cup of cooked oatmeal    2 ounces of cooked whole-grain pasta    ½ cup of cooked brown rice    Limit or do not have simple carbs, such as the following:      [employer], such as doughnuts, pastries, and cookies    Mixes for cornbread and biscuits    White rice and pasta mixes, such as boxed macaroni and cheese    Instant and cold cereals that contain sugar    Jelly, jam, and ice cream that contain sugar    Condiments such as ketchup    Drinks high in sugar, such as soft drinks, lemonade, and fruit juice    What you need to know about vegetables and fruits:  Vegetables and fruits can be fresh, frozen, or canned  If possible, try to choose low-sodium canned options  Include a variety of vegetables and fruits, such as the followin medium apple, pear, or peach (about ½ cup chopped)    ½ small banana    ½ cup berries, such as blueberries, strawberries, or blackberries    1 cup of raw leafy greens, such as lettuce, spinach, kale, or melvin greens    ½ cup of frozen or canned (no added salt) vegetables, such as green beans    ½ cup of fresh, frozen, or canned fruit (canned in light syrup or fruit juice)    ½ cup of vegetable or fruit juice    Limit or do not have vegetables and fruits made in the following ways:      Frozen fruit such as cherries that have added sugar    Fruit in cream or butter sauce    Canned vegetables that are high in sodium    Sauerkraut, pickled vegetables, and other foods prepared in brine    Fried vegetables or vegetables in butter or high-fat sauces    What you need to know about protein foods:    Include lean or low-fat protein foods, such as the following:      Poultry (chicken, turkey) with no skin    Fish (especially fatty fish, such as salmon, fresh tuna, or mackerel)    Lean beef and pork (loin, round, extra lean hamburger)    Egg whites and egg substitutes    1 cup of nonfat (skim) or 1% milk    1½ ounces of fat-free or low-fat cheese    6 ounces of nonfat or low-fat yogurt    Limit or do not have high-fat protein foods, such as the following:      Smoked or cured meat, such as corned beef, belcher, ham, hot dogs, and sausage    Canned beans and canned meats or spreads, such as potted meats, sardines, anchovies, and imitation seafood    Deli or lunch meats, such as bologna, ham, turkey, and roast beef    High-fat meat (T-bone steak, regular hamburger, and ribs)    Whole eggs and egg yolks    Whole milk, 2% milk, and cream    Regular cheese and processed cheese    Other guidelines to follow:   Maintain a healthy weight  Your risk for heart disease is higher if you are overweight  Your healthcare provider may suggest that you lose weight if you are overweight  You can lose weight by eating fewer calories and foods that have added sugars and fat  The DASH meal plan can help you do this  Decrease calories by eating smaller portions at each meal and fewer snacks  Ask your healthcare provider for more information about how to lose weight  Exercise regularly  Regular exercise can help you reach or maintain a healthy weight  Regular exercise can also help decrease your blood pressure and improve your cholesterol levels  Get 30 minutes or more of moderate exercise each day of the week  To lose weight, get at least 60 minutes of exercise  Talk to your healthcare provider about the best exercise program for you  Limit alcohol  Women should limit alcohol to 1 drink a day  Men should limit alcohol to 2 drinks a day  A drink of alcohol is 12 ounces of beer, 5 ounces of wine, or 1½ ounces of liquor  For more information:   [facility] [address]  [address]  Rash[place] Jul[place] [address]  Phone: 0- [phone] - [phone]  Web Address: [employer] no    © Copyright [employer] 2022 Information is for End User's use only and may not be sold, redistributed or otherwise used for commercial purposes  All illustrations and images included in CareNotes® are the copyrighted property of A D A M , Inc  or Wilfredo Norris  The above information is an  only  It is not intended as medical advice for individual conditions or treatments  Talk to your doctor, nurse or pharmacist before following any medical regimen to see if it is safe and effective for you

## 2023-06-13 ENCOUNTER — OFFICE VISIT (OUTPATIENT)
Dept: URGENT CARE | Facility: CLINIC | Age: 48
End: 2023-06-13
Payer: COMMERCIAL

## 2023-06-13 VITALS
HEIGHT: 65 IN | SYSTOLIC BLOOD PRESSURE: 220 MMHG | TEMPERATURE: 97.9 F | OXYGEN SATURATION: 100 % | BODY MASS INDEX: 17.83 KG/M2 | HEART RATE: 70 BPM | RESPIRATION RATE: 20 BRPM | WEIGHT: 107 LBS | DIASTOLIC BLOOD PRESSURE: 110 MMHG

## 2023-06-13 DIAGNOSIS — A08.4 VIRAL GASTROENTERITIS: ICD-10-CM

## 2023-06-13 DIAGNOSIS — I16.1 HYPERTENSIVE EMERGENCY: Primary | ICD-10-CM

## 2023-06-13 PROCEDURE — 99213 OFFICE O/P EST LOW 20 MIN: CPT | Performed by: PHYSICIAN ASSISTANT

## 2023-06-13 RX ORDER — ONDANSETRON 4 MG/1
4 TABLET, FILM COATED ORAL EVERY 8 HOURS PRN
Qty: 20 TABLET | Refills: 0 | Status: SHIPPED | OUTPATIENT
Start: 2023-06-13

## 2023-06-13 NOTE — PROGRESS NOTES
[facility]        NAME: Valentina Aviles is a 52 y o  female  : 1975    MRN: [id number]  DATE: 2023  TIME: 5:33 PM    Assessment and Plan   Hypertensive emergency [I16 1]  1  Hypertensive emergency  Transfer to other facility      2  Viral gastroenteritis  ondansetron (ZOFRAN) 4 mg tablet        Manual /110 in both arms with a  Recheck in the L arm of 210/110  Associated dizziness and blurry vision     Patient Instructions   There are no Patient Instructions on file for this visit  Follow up with PCP in 3-5 days  Proceed to  ER if symptoms worsen  Chief Complaint     Chief Complaint   Patient presents with   • Vomiting       Had vomiting x 2 diarrhea x 3          History of Present Illness       The patient is a 20-year-old female presenting today for vomiting and diarrhea that began today  Her son was recently sick with viral gastroenteritis  She reports 2 episodes of vomiting and 3 episodes of diarrhea  In the office her blood pressure was found to be 220/110  She reports moving here from [place] and having run out of blood pressure medicine  She normally takes Norvasc 2 5 mg and has not had it for 1 month  Blood pressure recheck was 210/110 the left arm  She reports for the last month having blurry vision in both of her eyes  She reports normally having 20/20 vision but when her eyes get blurry she cannot see the phone in front of her  She also has been feeling twitching in her left eye and feeling lightheaded at times  Denies any chest pain at the moment  Review of Systems   Review of Systems   Constitutional: Negative for activity change, appetite change, chills, fatigue and fever  HENT: Negative for congestion, ear pain, rhinorrhea, sinus pressure, sinus pain and sore throat  Eyes: Positive for visual disturbance (blurry vision )  Negative for pain  Respiratory: Negative for cough, chest tightness and shortness of breath      Cardiovascular: Negative for chest pain and palpitations  Gastrointestinal: Positive for diarrhea and vomiting  Negative for abdominal pain and nausea  Genitourinary: Negative for dysuria and hematuria  Musculoskeletal: Negative for arthralgias, back pain and myalgias  Skin: Negative for color change, pallor and rash  Neurological: Positive for dizziness and light-headedness  Negative for seizures, syncope and headaches  All other systems reviewed and are negative  Current Medications       Current Outpatient Medications:   •  ondansetron (ZOFRAN) 4 mg tablet, Take 1 tablet (4 mg total) by mouth every 8 (eight) hours as needed for nausea or vomiting, Disp: 20 tablet, Rfl: 0  •  amLODIPine (NORVASC) 2 5 mg tablet, Take 2 tablets (5 mg total) by mouth in the morning  (Patient not taking: Reported on 6/13/2023), Disp: 30 tablet, Rfl: 3  •  bacitracin topical ointment 500 units/g topical ointment, Apply 1 large application topically 2 (two) times a day (Patient not taking: Reported on 6/13/2023), Disp: 28 g, Rfl: 0  •  chlorthalidone 25 mg tablet, Take 0 5 tablets (12 5 mg total) by mouth in the morning   (Patient not taking: Reported on 6/13/2023), Disp: 30 tablet, Rfl: 5  •  hydrocortisone 1 % cream, Apply to affected area 2 times daily (Patient not taking: Reported on 6/13/2023), Disp: 15 g, Rfl: 0  •  nicotine (NICODERM CQ) 7 mg/24hr TD 24 hr patch, Place 1 patch on the skin every 24 hours (Patient not taking: Reported on 5/19/2022), Disp: 28 patch, Rfl: 0  •  witch hazel-glycerin (TUCKS) topical pad, Insert 1 pad into the rectum as needed for irritation (Patient not taking: Reported on 5/19/2022), Disp: 20 each, Rfl: 0    Current Allergies     Allergies as of 06/13/2023   • (No Known Allergies)            The following portions of the patient's history were reviewed and updated as appropriate: allergies, current medications, past family history, past medical history, past social history, past surgical history and "problem list      Past Medical History:   Diagnosis Date   • Hypertension        No past surgical history on file  Family History   Problem Relation Age of Onset   • Cancer Mother    • Asthma Mother    • Diabetes Mother          Medications have been verified  Objective   BP (!) 220/110   Pulse 70   Temp 97 9 °F (36 6 °C)   Resp 20   Ht 5' 5\" (1 651 m)   Wt 48 5 kg (107 lb)   SpO2 100%   BMI 17 81 kg/m²        Physical Exam     Physical Exam  Vitals and nursing note reviewed  Constitutional:       General: She is not in acute distress  Appearance: Normal appearance  She is normal weight  She is not ill-appearing, toxic-appearing or diaphoretic  HENT:      Head: Normocephalic and atraumatic  Eyes:      General:         Right eye: No discharge  Left eye: No discharge  Extraocular Movements: Extraocular movements intact  Pupils: Pupils are equal, round, and reactive to light  Comments: Scleral icterus    Cardiovascular:      Rate and Rhythm: Normal rate and regular rhythm  Heart sounds: Normal heart sounds  No murmur heard  No friction rub  No gallop  Pulmonary:      Effort: Pulmonary effort is normal  No respiratory distress  Breath sounds: Normal breath sounds  No stridor  No wheezing, rhonchi or rales  Chest:      Chest wall: No tenderness  Abdominal:      General: Abdomen is flat  Bowel sounds are normal  There is no distension  Palpations: Abdomen is soft  There is no mass  Tenderness: There is no abdominal tenderness  There is no guarding or rebound  Hernia: No hernia is present  Skin:     General: Skin is warm and dry  Capillary Refill: Capillary refill takes less than 2 seconds  Neurological:      Mental Status: She is alert                     "

## 2023-06-13 NOTE — LETTER
June 13, 2023     Patient: Miriam Covarrubias  YOB: 1975  Date of Visit: 6/13/2023      To Whom it May Concern:    Miriam Covarrubias is under my professional care  Nannette was seen in my office on 6/13/2023  Nannette will be going to the ER and they can further evaluate her  If you have any questions or concerns, please don't hesitate to call           Sincerely,          Eli Turner PA-C        CC: No Recipients

## 2023-06-14 ENCOUNTER — HOSPITAL ENCOUNTER (EMERGENCY)
Facility: HOSPITAL | Age: 48
Discharge: HOME | End: 2023-06-14
Attending: EMERGENCY MEDICINE
Payer: COMMERCIAL

## 2023-06-14 VITALS
TEMPERATURE: 98.1 F | HEART RATE: 88 BPM | DIASTOLIC BLOOD PRESSURE: 90 MMHG | SYSTOLIC BLOOD PRESSURE: 160 MMHG | RESPIRATION RATE: 18 BRPM | OXYGEN SATURATION: 100 %

## 2023-06-14 DIAGNOSIS — I10 HYPERTENSION: Primary | ICD-10-CM

## 2023-06-14 DIAGNOSIS — Z01.01 VISION EXAM WITH ABNORMAL FINDINGS: ICD-10-CM

## 2023-06-14 RX ORDER — AMLODIPINE BESYLATE 5 MG/1
5 TABLET ORAL DAILY
Qty: 30 TABLET | Refills: 0 | Status: SHIPPED | OUTPATIENT
Start: 2023-06-14

## 2023-06-14 RX ORDER — AMLODIPINE BESYLATE 5 MG/1
5 TABLET ORAL ONCE
Status: COMPLETED | OUTPATIENT
Start: 2023-06-14 | End: 2023-06-14

## 2023-06-14 RX ADMIN — AMLODIPINE BESYLATE 5 MG: 5 TABLET ORAL at 20:48

## 2023-06-14 NOTE — Clinical Note
Merlinda Ganja was seen and treated in our emergency department on 6/14/2023  Diagnosis:     Keira Alejandra  may return to work on return date  She may return on this date: 06/15/2023         If you have any questions or concerns, please don't hesitate to call        Willow Marroquin, DO    ______________________________           _______________          _______________  Hospital Representative                              Date                                Time

## 2023-06-15 NOTE — ED PROVIDER NOTES
"History  Chief Complaint   Patient presents with   • Hypertension     At urgent care yesterday for vomiting and blurred vision blood pressure was instructed to come yesterday but she had things to take care of, c/o blurry vision     Patient is a 51-year-old female with a history of hypertension, who admits that she is not compliant with her medications who presents emergency department per recommendation of the local urgent care  Patient states that she was evaluated at the urgent care yesterday, blood pressure was noted to be elevated, patient also complained of blurred vision, and she was advised to proceed to the emergency department for further evaluation  Patient states that she had \"things to do\" yesterday and was unable to come to the ED as recommended  Patient now in the ED  She explains her blurry vision as slow decline over the past few months where she has to hold the piece of paper at arms length to read fine print  She denies any headache or head pressure  She denies chest pain or shortness of breath  Patient admits that she has not taken her antihypertensives in approximately 8 months  Patient typically follows with [facility] family practice, but has not made recent appointment  History provided by:  Patient   used: No        Prior to Admission Medications   Prescriptions Last Dose Informant Patient Reported? Taking? amLODIPine (NORVASC) 2 5 mg tablet   No No   Sig: Take 2 tablets (5 mg total) by mouth in the morning  Patient not taking: Reported on 6/13/2023   bacitracin topical ointment 500 units/g topical ointment   No No   Sig: Apply 1 large application topically 2 (two) times a day   Patient not taking: Reported on 6/13/2023   chlorthalidone 25 mg tablet   No No   Sig: Take 0 5 tablets (12 5 mg total) by mouth in the morning     Patient not taking: Reported on 6/13/2023   hydrocortisone 1 % cream   No No   Sig: Apply to affected area 2 times daily   Patient not " "taking: Reported on 6/13/2023   nicotine (NICODERM CQ) 7 mg/24hr TD 24 hr patch   No No   Sig: Place 1 patch on the skin every 24 hours   Patient not taking: Reported on 5/19/2022   ondansetron (ZOFRAN) 4 mg tablet   No No   Sig: Take 1 tablet (4 mg total) by mouth every 8 (eight) hours as needed for nausea or vomiting   witch hazel-glycerin (TUCKS) topical pad   No No   Sig: Insert 1 pad into the rectum as needed for irritation   Patient not taking: Reported on 5/19/2022      Facility-Administered Medications: None       Past Medical History:   Diagnosis Date   • Hypertension        History reviewed  No pertinent surgical history  Family History   Problem Relation Age of Onset   • Cancer Mother    • Asthma Mother    • Diabetes Mother      I have reviewed and agree with the history as documented  E-Cigarette/Vaping   • E-Cigarette Use Never User      E-Cigarette/Vaping Substances     Social History     Tobacco Use   • Smoking status: Light Smoker   • Smokeless tobacco: Never   • Tobacco comments:     3 per day   Vaping Use   • Vaping Use: Never used   Substance Use Topics   • Alcohol use: Yes     Comment: \"a few beers a night\"   • Drug use: Yes     Types: Marijuana     Comment: \"I smoke a bunch of weed\"       Review of Systems   Constitutional: Negative for chills and fever  Respiratory: Negative for cough, chest tightness and shortness of breath  Gastrointestinal: Negative for abdominal pain, diarrhea, nausea and vomiting  Genitourinary: Negative for dysuria, frequency, hematuria and urgency  Musculoskeletal: Negative for back pain, neck pain and neck stiffness  All other systems reviewed and are negative  Physical Exam  Physical Exam  Vitals and nursing note reviewed  Constitutional:       General: She is not in acute distress  Appearance: She is well-developed  She is not diaphoretic  HENT:      Head: Normocephalic and atraumatic     Eyes:      Conjunctiva/sclera: Conjunctivae normal  " Pupils: Pupils are equal, round, and reactive to light  Cardiovascular:      Rate and Rhythm: Normal rate and regular rhythm  Heart sounds: Normal heart sounds  No murmur heard  Pulmonary:      Effort: Pulmonary effort is normal  No respiratory distress  Breath sounds: Normal breath sounds  Abdominal:      General: Bowel sounds are normal  There is no distension  Palpations: Abdomen is soft  Tenderness: There is no abdominal tenderness  Musculoskeletal:         General: No deformity  Normal range of motion  Cervical back: Normal range of motion and neck supple  Skin:     General: Skin is warm and dry  Capillary Refill: Capillary refill takes less than 2 seconds  Coloration: Skin is not pale  Findings: No rash  Neurological:      General: No focal deficit present  Mental Status: She is alert and oriented to person, place, and time  Cranial Nerves: No cranial nerve deficit     Psychiatric:         Behavior: Behavior normal          Vital Signs  ED Triage Vitals [06/14/23 2010]   Temperature Pulse Respirations Blood Pressure SpO2   98 1 °F (36 7 °C) 87 (!) 24 159/98 100 %      Temp Source Heart Rate Source Patient Position - Orthostatic VS BP Location FiO2 (%)   Tympanic Monitor Sitting Right arm --      Pain Score       No Pain           Vitals:    06/14/23 2010 06/14/23 2116   BP: 159/98 160/90   Pulse: 87 88   Patient Position - Orthostatic VS: Sitting Sitting         Visual Acuity  Visual Acuity    Flowsheet Row Most Recent Value   Visual acuity R eye is 20/25   Visual acuity Left eye is 20/25   Visual acuity in both eyes is 20/25   L Pupil Size (mm) 3   R Pupil Size (mm) 3   L Pupil Shape Round   R Pupil Shape Round          ED Medications  Medications   amLODIPine (NORVASC) tablet 5 mg (5 mg Oral Given 6/14/23 2048)       Diagnostic Studies  Results Reviewed     None                 No orders to display              Procedures  Procedures         ED Course                                             Medical Decision Making  Patient with asymptomatic hypertension  Will reinitiate Norvasc 5 mg  Patient to monitor blood pressure at home and to make the soonest appointment with [facility] family practice  Patient will be prescribed a month supply of Norvasc  Patient strongly encouraged to follow-up with primary care physician as soon as possible  Return precautions reviewed with patient  Risk  Prescription drug management  Disposition  Final diagnoses:   Hypertension   Vision exam with abnormal findings     Time reflects when diagnosis was documented in both MDM as applicable and the Disposition within this note     Time User Action Codes Description Comment    6/14/2023  8:43 PM Marina, [address] [facility] Hypertension     6/14/2023  8:43 PM Deniz Louise Add [Z01 01] Vision exam with abnormal findings       ED Disposition     ED Disposition   Discharge    Condition   Stable    Date/Time   Wed Jun 14, 2023  9:27 PM    Comment   [facility] discharge to home/self care  Follow-up Information     Follow up With Specialties Details Why [address] Optometry Schedule an appointment as soon as possible for a visit in 1 day for follow up [address] [facility]      [address]  [facility] Schedule an appointment as soon as possible for a visit in 1 day for follow up [address] [facility]  [phone]            Discharge Medication List as of 6/14/2023  9:27 PM      START taking these medications    Details   !! amLODIPine (NORVASC) 5 mg tablet Take 1 tablet (5 mg total) by mouth daily, Starting Wed 6/14/2023, Normal       !! - Potential duplicate medications found  Please discuss with provider        CONTINUE these medications which have NOT CHANGED    Details   !! amLODIPine (NORVASC) 2 5 mg tablet Take 2 tablets (5 mg total) by mouth in the morning , Starting Thu 5/19/2022, Normal      bacitracin topical ointment 500 units/g topical ointment Apply 1 large application topically 2 (two) times a day, Starting Tue 8/3/2021, Normal      chlorthalidone 25 mg tablet Take 0 5 tablets (12 5 mg total) by mouth in the morning , Starting Thu 5/19/2022, Normal      hydrocortisone 1 % cream Apply to affected area 2 times daily, Normal      nicotine (NICODERM CQ) 7 mg/24hr TD 24 hr patch Place 1 patch on the skin every 24 hours, Starting Mon 1/6/2020, Normal      ondansetron (ZOFRAN) 4 mg tablet Take 1 tablet (4 mg total) by mouth every 8 (eight) hours as needed for nausea or vomiting, Starting Tue 6/13/2023, Normal      witch hazel-glycerin (TUCKS) topical pad Insert 1 pad into the rectum as needed for irritation, Starting Mon 1/24/2022, Normal       !! - Potential duplicate medications found  Please discuss with provider  No discharge procedures on file      PDMP Review     None          ED Provider  Electronically Signed by           Gael Koch DO  06/15/23 8437

## 2023-06-15 NOTE — DISCHARGE INSTRUCTIONS
Return to the ER for further concerns or worsening symptoms  Follow up with your primary care physician, optometrist in 1-2 days  Take medication as prescribed  Check your blood pressure daily as instructed    Keep a log of the readings and take your blood pressure cuff with you to your next doctor's appointment

## 2023-06-21 PROBLEM — I10 PRIMARY HYPERTENSION: Status: ACTIVE | Noted: 2023-06-21

## 2023-08-08 ENCOUNTER — HOSPITAL ENCOUNTER (EMERGENCY)
Facility: HOSPITAL | Age: 48
Discharge: HOME | End: 2023-08-08
Attending: EMERGENCY MEDICINE | Admitting: EMERGENCY MEDICINE
Payer: COMMERCIAL

## 2023-08-08 ENCOUNTER — APPOINTMENT (EMERGENCY)
Dept: RADIOLOGY | Facility: HOSPITAL | Age: 48
End: 2023-08-08
Payer: COMMERCIAL

## 2023-08-08 VITALS
DIASTOLIC BLOOD PRESSURE: 106 MMHG | OXYGEN SATURATION: 100 % | TEMPERATURE: 98.8 F | RESPIRATION RATE: 18 BRPM | SYSTOLIC BLOOD PRESSURE: 210 MMHG | HEART RATE: 58 BPM

## 2023-08-08 DIAGNOSIS — K62.5 RECTAL BLEEDING: Primary | ICD-10-CM

## 2023-08-08 LAB
ANION GAP SERPL CALCULATED.3IONS-SCNC: 8 MMOL/L
BACTERIA UR QL AUTO: NORMAL /HPF
BASOPHILS # BLD AUTO: 0.05 THOUSANDS/ÂΜL (ref 0–0.1)
BASOPHILS NFR BLD AUTO: 1 % (ref 0–1)
BILIRUB UR QL STRIP: NEGATIVE
BUN SERPL-MCNC: 16 MG/DL (ref 5–25)
CALCIUM SERPL-MCNC: 9 MG/DL (ref 8.4–10.2)
CHLORIDE SERPL-SCNC: 102 MMOL/L (ref 96–108)
CLARITY UR: CLEAR
CO2 SERPL-SCNC: 26 MMOL/L (ref 21–32)
COLOR UR: YELLOW
CREAT SERPL-MCNC: 0.63 MG/DL (ref 0.6–1.3)
EOSINOPHIL # BLD AUTO: 0.05 THOUSAND/ÂΜL (ref 0–0.61)
EOSINOPHIL NFR BLD AUTO: 1 % (ref 0–6)
ERYTHROCYTE [DISTWIDTH] IN BLOOD BY AUTOMATED COUNT: 12.9 % (ref 11.6–15.1)
GFR SERPL CREATININE-BSD FRML MDRD: 106 ML/MIN/1.73SQ M
GLUCOSE SERPL-MCNC: 120 MG/DL (ref 65–140)
GLUCOSE UR STRIP-MCNC: NEGATIVE MG/DL
HCT VFR BLD AUTO: 43.7 % (ref 34.8–46.1)
HGB BLD-MCNC: 14.9 G/DL (ref 11.5–15.4)
HGB UR QL STRIP.AUTO: ABNORMAL
IMM GRANULOCYTES # BLD AUTO: 0.01 THOUSAND/UL (ref 0–0.2)
IMM GRANULOCYTES NFR BLD AUTO: 0 % (ref 0–2)
KETONES UR STRIP-MCNC: NEGATIVE MG/DL
LEUKOCYTE ESTERASE UR QL STRIP: NEGATIVE
LYMPHOCYTES # BLD AUTO: 1.11 THOUSANDS/ÂΜL (ref 0.6–4.47)
LYMPHOCYTES NFR BLD AUTO: 19 % (ref 14–44)
MCH RBC QN AUTO: 32 PG (ref 26.8–34.3)
MCHC RBC AUTO-ENTMCNC: 34.1 G/DL (ref 31.4–37.4)
MCV RBC AUTO: 94 FL (ref 82–98)
MONOCYTES # BLD AUTO: 0.69 THOUSAND/ÂΜL (ref 0.17–1.22)
MONOCYTES NFR BLD AUTO: 12 % (ref 4–12)
NEUTROPHILS # BLD AUTO: 3.95 THOUSANDS/ÂΜL (ref 1.85–7.62)
NEUTS SEG NFR BLD AUTO: 67 % (ref 43–75)
NITRITE UR QL STRIP: NEGATIVE
NON-SQ EPI CELLS URNS QL MICRO: NORMAL /HPF
PH UR STRIP.AUTO: 6.5 [PH]
PLATELET # BLD AUTO: 268 THOUSANDS/UL (ref 149–390)
PMV BLD AUTO: 9.7 FL (ref 8.9–12.7)
POTASSIUM SERPL-SCNC: 3.4 MMOL/L (ref 3.5–5.3)
PROT UR STRIP-MCNC: ABNORMAL MG/DL
RBC # BLD AUTO: 4.66 MILLION/UL (ref 3.81–5.12)
RBC #/AREA URNS AUTO: NORMAL /HPF
SODIUM SERPL-SCNC: 136 MMOL/L (ref 135–147)
SP GR UR STRIP.AUTO: 1.02 (ref 1–1.03)
UROBILINOGEN UR QL STRIP.AUTO: 0.2 E.U./DL
WBC # BLD AUTO: 5.86 THOUSAND/UL (ref 4.31–10.16)
WBC #/AREA URNS AUTO: NORMAL /HPF

## 2023-08-08 PROCEDURE — 85025 COMPLETE CBC W/AUTO DIFF WBC: CPT | Performed by: PHYSICIAN ASSISTANT

## 2023-08-08 PROCEDURE — 74178 CT ABD&PLV WO CNTR FLWD CNTR: CPT

## 2023-08-08 PROCEDURE — 80048 BASIC METABOLIC PNL TOTAL CA: CPT | Performed by: PHYSICIAN ASSISTANT

## 2023-08-08 PROCEDURE — G1004 CDSM NDSC: HCPCS

## 2023-08-08 PROCEDURE — 81001 URINALYSIS AUTO W/SCOPE: CPT | Performed by: PHYSICIAN ASSISTANT

## 2023-08-08 PROCEDURE — 36415 COLL VENOUS BLD VENIPUNCTURE: CPT | Performed by: PHYSICIAN ASSISTANT

## 2023-08-08 RX ADMIN — IOHEXOL 100 ML: 350 INJECTION, SOLUTION INTRAVENOUS at 13:55

## 2023-08-08 RX ADMIN — SODIUM CHLORIDE 1000 ML: 0.9 INJECTION, SOLUTION INTRAVENOUS at 12:35

## 2023-08-08 NOTE — ED PROVIDER NOTES
History  Chief Complaint   Patient presents with   • Rectal Bleeding     Pt c/o spotting rectal blood since Saturday. Spotting occurs when wiping after BM. No pain during BM but pain upon wiping. 63-year-old female presenting today for evaluation of blood in her stool over the past 3 to 4 days. Notes some rectal pain as well. Has had this before however seems to be more severe. Feeling lightheaded intermittently. States she initially noted blood when she was sitting at home and it got on her dress. She had subsequent episodes of rectal bleeding while having bowel movements over the past few days. States that her rectum is now sore. Father has history of colon cancer. Has gone through menopause at age of 37. Has never had a colonoscopy. Denies nausea, vomiting, shortness of breath, chest pain. Differential includes but is not limited to external hemorrhoid, diverticula etc.           Prior to Admission Medications   Prescriptions Last Dose Informant Patient Reported? Taking? amLODIPine (NORVASC) 2.5 mg tablet   No No   Sig: Take 2 tablets (5 mg total) by mouth in the morning. Patient not taking: Reported on 6/13/2023   amLODIPine (NORVASC) 5 mg tablet   No No   Sig: Take 1 tablet (5 mg total) by mouth daily   bacitracin topical ointment 500 units/g topical ointment   No No   Sig: Apply 1 large application topically 2 (two) times a day   Patient not taking: Reported on 6/13/2023   chlorthalidone 25 mg tablet   No No   Sig: Take 0.5 tablets (12.5 mg total) by mouth in the morning.    Patient not taking: Reported on 6/13/2023   hydrocortisone 1 % cream   No No   Sig: Apply to affected area 2 times daily   Patient not taking: Reported on 6/13/2023   nicotine (NICODERM CQ) 7 mg/24hr TD 24 hr patch   No No   Sig: Place 1 patch on the skin every 24 hours   Patient not taking: Reported on 5/19/2022   ondansetron (ZOFRAN) 4 mg tablet   No No   Sig: Take 1 tablet (4 mg total) by mouth every 8 (eight) hours as needed for nausea or vomiting   witch hazel-glycerin (TUCKS) topical pad   No No   Sig: Insert 1 pad into the rectum as needed for irritation   Patient not taking: Reported on 5/19/2022      Facility-Administered Medications: None       Past Medical History:   Diagnosis Date   • Hypertension        History reviewed. No pertinent surgical history. Family History   Problem Relation Age of Onset   • Cancer Mother    • Asthma Mother    • Diabetes Mother      I have reviewed and agree with the history as documented. E-Cigarette/Vaping   • E-Cigarette Use Never User      E-Cigarette/Vaping Substances     Social History     Tobacco Use   • Smoking status: Light Smoker   • Smokeless tobacco: Never   • Tobacco comments:     3 per day   Vaping Use   • Vaping Use: Never used   Substance Use Topics   • Alcohol use: Yes     Comment: "a few beers a night"   • Drug use: Yes     Types: Marijuana     Comment: "I smoke a bunch of weed"       Review of Systems   Constitutional: Negative. Negative for chills, fatigue and fever. HENT: Negative. Negative for congestion, postnasal drip, rhinorrhea and sore throat. Eyes: Negative. Respiratory: Negative. Negative for cough, shortness of breath and wheezing. Cardiovascular: Negative. Gastrointestinal: Positive for blood in stool and rectal pain. Negative for abdominal distention, abdominal pain, anal bleeding, constipation, diarrhea, nausea and vomiting. Endocrine: Negative. Genitourinary: Negative. Musculoskeletal: Negative. Skin: Negative. Neurological: Negative. Hematological: Negative. Psychiatric/Behavioral: Negative. All other systems reviewed and are negative. Physical Exam  Physical Exam  Vitals and nursing note reviewed. Constitutional:       General: She is not in acute distress. Appearance: Normal appearance. She is well-developed and normal weight. She is not diaphoretic. HENT:      Head: Normocephalic and atraumatic. Right Ear: External ear normal.      Left Ear: External ear normal.      Nose: Nose normal.      Mouth/Throat:      Pharynx: No oropharyngeal exudate. Eyes:      General: No scleral icterus. Right eye: No discharge. Left eye: No discharge. Conjunctiva/sclera: Conjunctivae normal.      Pupils: Pupils are equal, round, and reactive to light. Cardiovascular:      Rate and Rhythm: Normal rate and regular rhythm. Heart sounds: Normal heart sounds. No murmur heard. No friction rub. No gallop. Pulmonary:      Effort: Pulmonary effort is normal. No respiratory distress. Breath sounds: Normal breath sounds. No stridor. No wheezing, rhonchi or rales. Chest:      Chest wall: No tenderness. Abdominal:      General: Abdomen is flat. Bowel sounds are normal. There is no distension. Palpations: Abdomen is soft. There is no mass. Tenderness: There is no abdominal tenderness. There is no guarding or rebound. Hernia: No hernia is present. Genitourinary:      Musculoskeletal:      Cervical back: Normal range of motion and neck supple. Lymphadenopathy:      Cervical: No cervical adenopathy. Skin:     General: Skin is warm and dry. Capillary Refill: Capillary refill takes less than 2 seconds. Coloration: Skin is not pale. Findings: No erythema or rash. Neurological:      General: No focal deficit present. Mental Status: She is alert and oriented to person, place, and time. Mental status is at baseline.          Vital Signs  ED Triage Vitals [08/08/23 1104]   Temperature Pulse Respirations Blood Pressure SpO2   98.8 °F (37.1 °C) 80 18 (!) 218/118 100 %      Temp Source Heart Rate Source Patient Position - Orthostatic VS BP Location FiO2 (%)   Oral Monitor Sitting Left arm --      Pain Score       No Pain           Vitals:    08/08/23 1104 08/08/23 1330   BP: (!) 218/118 (!) 210/106   Pulse: 80 58   Patient Position - Orthostatic VS: Sitting Lying Visual Acuity      ED Medications  Medications   sodium chloride 0.9 % bolus 1,000 mL (0 mL Intravenous Stopped 8/8/23 1417)   iohexol (OMNIPAQUE) 350 MG/ML injection (MULTI-DOSE) 100 mL (100 mL Intravenous Given 8/8/23 1355)       Diagnostic Studies  Results Reviewed     Procedure Component Value Units Date/Time    Urine Microscopic [[id number]]  (Normal) Collected: 08/08/23 1237    Lab Status: Final result Specimen: Urine, Other Updated: 08/08/23 1354     RBC, UA 2-4 /hpf      WBC, UA 0-1 /hpf      Epithelial Cells Occasional /hpf      Bacteria, UA Occasional /hpf     Basic metabolic panel [[id number]]  (Abnormal) Collected: 08/08/23 1226    Lab Status: Final result Specimen: Blood from Arm, Right Updated: 08/08/23 1349     Sodium 136 mmol/L      Potassium 3.4 mmol/L      Chloride 102 mmol/L      CO2 26 mmol/L      ANION GAP 8 mmol/L      BUN 16 mg/dL      Creatinine 0.63 mg/dL      Glucose 120 mg/dL      Calcium 9.0 mg/dL      eGFR 106 ml/min/1.73sq m     Narrative:      [place] guidelines for Chronic Kidney Disease (CKD):   •  Stage 1 with normal or high GFR (GFR > 90 mL/min/1.73 square meters)  •  Stage 2 Mild CKD (GFR = 60-89 mL/min/1.73 square meters)  •  Stage 3A Moderate CKD (GFR = 45-59 mL/min/1.73 square meters)  •  Stage 3B Moderate CKD (GFR = 30-44 mL/min/1.73 square meters)  •  Stage 4 Severe CKD (GFR = 15-29 mL/min/1.73 square meters)  •  Stage 5 End Stage CKD (GFR <15 mL/min/1.73 square meters)  Note: GFR calculation is accurate only with a steady state creatinine    UA w Reflex to Microscopic w Reflex to Culture [[id number]]  (Abnormal) Collected: 08/08/23 1237    Lab Status: Final result Specimen: Urine, Other Updated: 08/08/23 1325     Color, UA Yellow     Clarity, UA Clear     Specific Gravity, UA 1.020     pH, UA 6.5     Leukocytes, UA Negative     Nitrite, UA Negative     Protein, UA Trace mg/dl      Glucose, UA Negative mg/dl      Ketones, UA Negative mg/dl Urobilinogen, UA 0.2 E.U./dl      Bilirubin, UA Negative     Occult Blood, UA Trace-Intact    CBC and differential [[id number]]  (Normal) Collected: 08/08/23 1226    Lab Status: Final result Specimen: Blood from Arm, Right Updated: 08/08/23 1309     WBC 5.86 Thousand/uL      RBC 4.66 Million/uL      Hemoglobin 14.9 g/dL      Hematocrit 43.7 %      MCV 94 fL      MCH 32.0 pg      MCHC 34.1 g/dL      RDW 12.9 %      MPV 9.7 fL      Platelets 171 Thousands/uL      Neutrophils Relative 67 %      Immat GRANS % 0 %      Lymphocytes Relative 19 %      Monocytes Relative 12 %      Eosinophils Relative 1 %      Basophils Relative 1 %      Neutrophils Absolute 3.95 Thousands/µL      Immature Grans Absolute 0.01 Thousand/uL      Lymphocytes Absolute 1.11 Thousands/µL      Monocytes Absolute 0.69 Thousand/µL      Eosinophils Absolute 0.05 Thousand/µL      Basophils Absolute 0.05 Thousands/µL                  CT high volume bleeding scan abdomen pelvis   Final Result by Emelyn Olea MD (08/08 1444)      No CT evidence of active high volume gastrointestinal hemorrhage. Workstation performed: [id number]                    Procedures  Procedures         ED Course  ED Course as of 08/08/23 1516   Tue Aug 08, 2023   1353 Blood Pressure(!): 210/106  Patient has known hypertension, she is noncompliant with her medications                               SBIRT 20yo+    Flowsheet Row Most Recent Value   Initial Alcohol Screen: US AUDIT-C     1. How often do you have a drink containing alcohol? 2 Filed at: 08/08/2023 1108   2. How many drinks containing alcohol do you have on a typical day you are drinking? 3 Filed at: 08/08/2023 1108   3a. Male UNDER 65: How often do you have five or more drinks on one occasion? 0 Filed at: 08/08/2023 1108   3b. FEMALE Any Age, or MALE 65+: How often do you have 4 or more drinks on one occassion?  2 Filed at: 08/08/2023 1108   Audit-C Score 7 Filed at: 08/08/2023 1108 Medical Decision Making  Slightly low potassium at 3.4 which is negligible. Discussed with patient lab work and imaging studies. Given family history would like patient to follow-up with gastroenterology as well as PCP. No obvious external hemorrhoids noted on exam.  Patient does not want to take her blood pressure medication, gave education regarding the importance of adhering to this medication and the long-term ramifications should she not take this medication. Hypertensive here in the emergency department. Patient is informed to return to the emergency department for worsening of symptoms and was given proper education regarding their diagnosis and symptoms. Otherwise the patient is informed to follow up with their primary care doctor, gastroenterology for re-evaluation. The patient verbalizes understanding and agrees with above assessment and plan. All questions were answered. Rectal bleeding: acute illness or injury  Amount and/or Complexity of Data Reviewed  Labs: ordered. Radiology: ordered. Risk  Prescription drug management. Disposition  Final diagnoses:   Rectal bleeding     Time reflects when diagnosis was documented in both MDM as applicable and the Disposition within this note     Time User Action Codes Description Comment    8/8/2023  3:05 PM Matheus Robbins Add [K62.5] Rectal bleeding       ED Disposition     ED Disposition   Discharge    Condition   Stable    Date/Time   Tue Aug 8, 2023  3:05 PM    Comment   [facility] discharge to home/self care.                Follow-up Information     Follow up With Specialties Details Why Contact Info Additional [address] [facility] Emergency Department Emergency Medicine Go to  If symptoms worsen, otherwise please follow up with your family doctor [address] [address]  [address] [facility] Emergency Department, [address] [place] [address] [address], Denisse Cartwright, [address] [facility], [place] Gastroenterology Schedule an appointment as soon as possible for a visit   [address] [place] [address]  [phone]             Discharge Medication List as of 2023  3:06 PM      CONTINUE these medications which have NOT CHANGED    Details   !! amLODIPine (NORVASC) 2.5 mg tablet Take 2 tablets (5 mg total) by mouth in the morning., Starting 2022, Normal      !! amLODIPine (NORVASC) 5 mg tablet Take 1 tablet (5 mg total) by mouth daily, Starting 2023, Normal      bacitracin topical ointment 500 units/g topical ointment Apply 1 large application topically 2 (two) times a day, Starting Tue 8/3/2021, Normal      chlorthalidone 25 mg tablet Take 0.5 tablets (12.5 mg total) by mouth in the morning., Starting 2022, Normal      hydrocortisone 1 % cream Apply to affected area 2 times daily, Normal      nicotine (NICODERM CQ) 7 mg/24hr TD 24 hr patch Place 1 patch on the skin every 24 hours, Starting 2020, Normal      ondansetron (ZOFRAN) 4 mg tablet Take 1 tablet (4 mg total) by mouth every 8 (eight) hours as needed for nausea or vomiting, Starting 2023, Normal      witch hazel-glycerin (TUCKS) topical pad Insert 1 pad into the rectum as needed for irritation, Starting 2022, Normal       !! - Potential duplicate medications found. Please discuss with provider. No discharge procedures on file.     PDMP Review     None          ED Provider  Electronically Signed by           Gracia Apgar, PA-C  23 1432

## 2023-08-08 NOTE — Clinical Note
Herber Chavez was seen and treated in our emergency department on 8/8/2023. Diagnosis:     Neithen  . She may return on this date: If you have any questions or concerns, please don't hesitate to call.       Aura Gracia RN    ______________________________           _______________          _______________  Hospital Representative                              Date                                Time

## 2024-06-23 ENCOUNTER — APPOINTMENT (EMERGENCY)
Dept: RADIOLOGY | Facility: HOSPITAL | Age: 49
End: 2024-06-23
Payer: COMMERCIAL

## 2024-06-23 ENCOUNTER — HOSPITAL ENCOUNTER (EMERGENCY)
Facility: HOSPITAL | Age: 49
Discharge: HOME | End: 2024-06-23
Attending: EMERGENCY MEDICINE | Admitting: EMERGENCY MEDICINE
Payer: COMMERCIAL

## 2024-06-23 VITALS
WEIGHT: 103 LBS | DIASTOLIC BLOOD PRESSURE: 97 MMHG | SYSTOLIC BLOOD PRESSURE: 195 MMHG | TEMPERATURE: 98.5 F | OXYGEN SATURATION: 100 % | HEART RATE: 76 BPM | RESPIRATION RATE: 13 BRPM | BODY MASS INDEX: 17.14 KG/M2

## 2024-06-23 DIAGNOSIS — R07.9 CHEST PAIN: ICD-10-CM

## 2024-06-23 DIAGNOSIS — K29.70 GASTRITIS: Primary | ICD-10-CM

## 2024-06-23 LAB
2HR DELTA HS TROPONIN: 0 NG/L
ALBUMIN SERPL BCG-MCNC: 4 G/DL (ref 3.5–5)
ALP SERPL-CCNC: 99 U/L (ref 34–104)
ALT SERPL W P-5'-P-CCNC: 15 U/L (ref 7–52)
ANION GAP SERPL CALCULATED.3IONS-SCNC: 13 MMOL/L (ref 4–13)
AST SERPL W P-5'-P-CCNC: 27 U/L (ref 13–39)
BASOPHILS # BLD AUTO: 0.06 THOUSANDS/ÂΜL (ref 0–0.1)
BASOPHILS NFR BLD AUTO: 1 % (ref 0–1)
BILIRUB SERPL-MCNC: 0.95 MG/DL (ref 0.2–1)
BUN SERPL-MCNC: 12 MG/DL (ref 5–25)
CALCIUM SERPL-MCNC: 8.3 MG/DL (ref 8.4–10.2)
CARDIAC TROPONIN I PNL SERPL HS: 7 NG/L
CARDIAC TROPONIN I PNL SERPL HS: 7 NG/L
CHLORIDE SERPL-SCNC: 103 MMOL/L (ref 96–108)
CO2 SERPL-SCNC: 21 MMOL/L (ref 21–32)
CREAT SERPL-MCNC: 0.6 MG/DL (ref 0.6–1.3)
EOSINOPHIL # BLD AUTO: 0.05 THOUSAND/ÂΜL (ref 0–0.61)
EOSINOPHIL NFR BLD AUTO: 1 % (ref 0–6)
ERYTHROCYTE [DISTWIDTH] IN BLOOD BY AUTOMATED COUNT: 13.1 % (ref 11.6–15.1)
ETHANOL SERPL-MCNC: 80 MG/DL
GFR SERPL CREATININE-BSD FRML MDRD: 107 ML/MIN/1.73SQ M
GLUCOSE SERPL-MCNC: 123 MG/DL (ref 65–140)
HCT VFR BLD AUTO: 41.9 % (ref 34.8–46.1)
HGB BLD-MCNC: 13.9 G/DL (ref 11.5–15.4)
IMM GRANULOCYTES # BLD AUTO: 0.03 THOUSAND/UL (ref 0–0.2)
IMM GRANULOCYTES NFR BLD AUTO: 0 % (ref 0–2)
LIPASE SERPL-CCNC: 37 U/L (ref 11–82)
LYMPHOCYTES # BLD AUTO: 0.82 THOUSANDS/ÂΜL (ref 0.6–4.47)
LYMPHOCYTES NFR BLD AUTO: 11 % (ref 14–44)
MAGNESIUM SERPL-MCNC: 1.6 MG/DL (ref 1.9–2.7)
MCH RBC QN AUTO: 32.5 PG (ref 26.8–34.3)
MCHC RBC AUTO-ENTMCNC: 33.2 G/DL (ref 31.4–37.4)
MCV RBC AUTO: 98 FL (ref 82–98)
MONOCYTES # BLD AUTO: 0.53 THOUSAND/ÂΜL (ref 0.17–1.22)
MONOCYTES NFR BLD AUTO: 7 % (ref 4–12)
NEUTROPHILS # BLD AUTO: 5.88 THOUSANDS/ÂΜL (ref 1.85–7.62)
NEUTS SEG NFR BLD AUTO: 80 % (ref 43–75)
NRBC BLD AUTO-RTO: 0 /100 WBCS
PLATELET # BLD AUTO: 280 THOUSANDS/UL (ref 149–390)
PMV BLD AUTO: 9 FL (ref 8.9–12.7)
POTASSIUM SERPL-SCNC: 3 MMOL/L (ref 3.5–5.3)
PROT SERPL-MCNC: 7.1 G/DL (ref 6.4–8.4)
RBC # BLD AUTO: 4.28 MILLION/UL (ref 3.81–5.12)
SODIUM SERPL-SCNC: 137 MMOL/L (ref 135–147)
WBC # BLD AUTO: 7.37 THOUSAND/UL (ref 4.31–10.16)

## 2024-06-23 PROCEDURE — 99285 EMERGENCY DEPT VISIT HI MDM: CPT

## 2024-06-23 PROCEDURE — 83690 ASSAY OF LIPASE: CPT | Performed by: EMERGENCY MEDICINE

## 2024-06-23 PROCEDURE — 85025 COMPLETE CBC W/AUTO DIFF WBC: CPT

## 2024-06-23 PROCEDURE — 96375 TX/PRO/DX INJ NEW DRUG ADDON: CPT

## 2024-06-23 PROCEDURE — 82077 ASSAY SPEC XCP UR&BREATH IA: CPT

## 2024-06-23 PROCEDURE — C9113 INJ PANTOPRAZOLE SODIUM, VIA: HCPCS | Performed by: EMERGENCY MEDICINE

## 2024-06-23 PROCEDURE — 84484 ASSAY OF TROPONIN QUANT: CPT

## 2024-06-23 PROCEDURE — 36415 COLL VENOUS BLD VENIPUNCTURE: CPT

## 2024-06-23 PROCEDURE — 83735 ASSAY OF MAGNESIUM: CPT | Performed by: EMERGENCY MEDICINE

## 2024-06-23 PROCEDURE — 71045 X-RAY EXAM CHEST 1 VIEW: CPT

## 2024-06-23 PROCEDURE — 96361 HYDRATE IV INFUSION ADD-ON: CPT

## 2024-06-23 PROCEDURE — 93005 ELECTROCARDIOGRAM TRACING: CPT

## 2024-06-23 PROCEDURE — 80053 COMPREHEN METABOLIC PANEL: CPT

## 2024-06-23 PROCEDURE — 96365 THER/PROPH/DIAG IV INF INIT: CPT

## 2024-06-23 PROCEDURE — 99285 EMERGENCY DEPT VISIT HI MDM: CPT | Performed by: EMERGENCY MEDICINE

## 2024-06-23 RX ORDER — ONDANSETRON 2 MG/ML
4 INJECTION INTRAMUSCULAR; INTRAVENOUS ONCE
Status: COMPLETED | OUTPATIENT
Start: 2024-06-23 | End: 2024-06-23

## 2024-06-23 RX ORDER — POTASSIUM CHLORIDE 20 MEQ/1
40 TABLET, EXTENDED RELEASE ORAL ONCE
Status: COMPLETED | OUTPATIENT
Start: 2024-06-23 | End: 2024-06-23

## 2024-06-23 RX ORDER — LABETALOL HYDROCHLORIDE 5 MG/ML
10 INJECTION, SOLUTION INTRAVENOUS ONCE
Status: COMPLETED | OUTPATIENT
Start: 2024-06-23 | End: 2024-06-23

## 2024-06-23 RX ORDER — MAGNESIUM SULFATE HEPTAHYDRATE 40 MG/ML
2 INJECTION, SOLUTION INTRAVENOUS ONCE
Status: COMPLETED | OUTPATIENT
Start: 2024-06-23 | End: 2024-06-23

## 2024-06-23 RX ORDER — MORPHINE SULFATE 4 MG/ML
4 INJECTION, SOLUTION INTRAMUSCULAR; INTRAVENOUS ONCE
Status: COMPLETED | OUTPATIENT
Start: 2024-06-23 | End: 2024-06-23

## 2024-06-23 RX ORDER — PANTOPRAZOLE SODIUM 20 MG/1
20 TABLET, DELAYED RELEASE ORAL DAILY
Qty: 20 TABLET | Refills: 0 | Status: SHIPPED | OUTPATIENT
Start: 2024-06-23

## 2024-06-23 RX ORDER — PANTOPRAZOLE SODIUM 40 MG/10ML
40 INJECTION, POWDER, LYOPHILIZED, FOR SOLUTION INTRAVENOUS ONCE
Status: COMPLETED | OUTPATIENT
Start: 2024-06-23 | End: 2024-06-23

## 2024-06-23 RX ORDER — HYDRALAZINE HYDROCHLORIDE 20 MG/ML
10 INJECTION INTRAMUSCULAR; INTRAVENOUS ONCE
Status: COMPLETED | OUTPATIENT
Start: 2024-06-23 | End: 2024-06-23

## 2024-06-23 RX ORDER — ONDANSETRON 4 MG/1
4 TABLET, ORALLY DISINTEGRATING ORAL EVERY 6 HOURS PRN
Qty: 9 TABLET | Refills: 0 | Status: SHIPPED | OUTPATIENT
Start: 2024-06-23 | End: 2024-06-26

## 2024-06-23 RX ADMIN — MAGNESIUM SULFATE HEPTAHYDRATE 2 G: 40 INJECTION, SOLUTION INTRAVENOUS at 14:03

## 2024-06-23 RX ADMIN — MORPHINE SULFATE 4 MG: 4 INJECTION INTRAVENOUS at 14:02

## 2024-06-23 RX ADMIN — ONDANSETRON 4 MG: 2 INJECTION INTRAMUSCULAR; INTRAVENOUS at 13:07

## 2024-06-23 RX ADMIN — LABETALOL HYDROCHLORIDE 10 MG: 5 INJECTION, SOLUTION INTRAVENOUS at 14:00

## 2024-06-23 RX ADMIN — PANTOPRAZOLE SODIUM 40 MG: 40 INJECTION, POWDER, FOR SOLUTION INTRAVENOUS at 13:08

## 2024-06-23 RX ADMIN — POTASSIUM CHLORIDE 40 MEQ: 1500 TABLET, EXTENDED RELEASE ORAL at 14:07

## 2024-06-23 RX ADMIN — HYDRALAZINE HYDROCHLORIDE 10 MG: 20 INJECTION INTRAMUSCULAR; INTRAVENOUS at 15:07

## 2024-06-23 RX ADMIN — SODIUM CHLORIDE 1000 ML: 0.9 INJECTION, SOLUTION INTRAVENOUS at 13:09

## 2024-06-23 NOTE — DISCHARGE INSTRUCTIONS
Please stop drinking alcohol take nausea medicine and Protonix as prescribed take your medications as prescribed follow-up with your doctor.

## 2024-06-23 NOTE — ED PROVIDER NOTES
History  Chief Complaint   Patient presents with    Chest Pain     States chest pains that started at work 4d ago. States vomites every morning. Daily beer drinker.     49-year-old female presents with epigastric pain chest pain nonradiating sharp continuous currently 8 out of 10 nothing makes it better or worse.  She drinks alcohol also has a history of hypertension she took her medications this morning.  Admits to nausea and 1 episode of vomiting denies diarrhea constipation shortness of breath pleurisy cough fevers trauma or any other symptoms.      History provided by:  Patient   used: No        Prior to Admission Medications   Prescriptions Last Dose Informant Patient Reported? Taking?   amLODIPine (NORVASC) 2.5 mg tablet   No No   Sig: Take 2 tablets (5 mg total) by mouth in the morning.   Patient not taking: Reported on 6/13/2023   amLODIPine (NORVASC) 5 mg tablet   No No   Sig: Take 1 tablet (5 mg total) by mouth daily   bacitracin topical ointment 500 units/g topical ointment   No No   Sig: Apply 1 large application topically 2 (two) times a day   Patient not taking: Reported on 6/13/2023   chlorthalidone 25 mg tablet   No No   Sig: Take 0.5 tablets (12.5 mg total) by mouth in the morning.   Patient not taking: Reported on 6/13/2023   hydrocortisone 1 % cream   No No   Sig: Apply to affected area 2 times daily   Patient not taking: Reported on 6/13/2023   nicotine (NICODERM CQ) 7 mg/24hr TD 24 hr patch   No No   Sig: Place 1 patch on the skin every 24 hours   Patient not taking: Reported on 5/19/2022   ondansetron (ZOFRAN) 4 mg tablet   No No   Sig: Take 1 tablet (4 mg total) by mouth every 8 (eight) hours as needed for nausea or vomiting   witch hazel-glycerin (TUCKS) topical pad   No No   Sig: Insert 1 pad into the rectum as needed for irritation   Patient not taking: Reported on 5/19/2022      Facility-Administered Medications: None       Past Medical History:   Diagnosis Date     "Hypertension        History reviewed. No pertinent surgical history.    Family History   Problem Relation Age of Onset    Cancer Mother     Asthma Mother     Diabetes Mother      I have reviewed and agree with the history as documented.    E-Cigarette/Vaping    E-Cigarette Use Never User      E-Cigarette/Vaping Substances    Nicotine No     THC No     CBD No     Flavoring No     Other No     Unknown No      Social History     Tobacco Use    Smoking status: Every Day     Types: Cigarettes    Smokeless tobacco: Never    Tobacco comments:     3 per day   Vaping Use    Vaping status: Never Used   Substance Use Topics    Alcohol use: Yes     Comment: \"a few beers a night\"    Drug use: Yes     Types: Marijuana     Comment: \"I smoke a bunch of weed\"       Review of Systems   Constitutional: Negative.    HENT: Negative.     Eyes: Negative.    Respiratory: Negative.     Cardiovascular:  Positive for chest pain.   Gastrointestinal:  Positive for abdominal pain.   Endocrine: Negative.    Genitourinary: Negative.    Musculoskeletal: Negative.    Skin: Negative.    Allergic/Immunologic: Negative.    Neurological: Negative.    Hematological: Negative.    Psychiatric/Behavioral: Negative.     All other systems reviewed and are negative.      Physical Exam  Physical Exam  Vitals and nursing note reviewed.   Constitutional:       Appearance: Normal appearance.   HENT:      Head: Normocephalic and atraumatic.      Nose: Nose normal.      Mouth/Throat:      Mouth: Mucous membranes are moist.   Eyes:      Extraocular Movements: Extraocular movements intact.      Pupils: Pupils are equal, round, and reactive to light.   Cardiovascular:      Rate and Rhythm: Normal rate and regular rhythm.   Pulmonary:      Effort: Pulmonary effort is normal.      Breath sounds: Normal breath sounds.   Abdominal:      General: Abdomen is flat. Bowel sounds are normal.      Palpations: Abdomen is soft.      Comments: Epi gastric abdominal tenderness noted " no guarding rigidity rebound tenderness.   Musculoskeletal:         General: Normal range of motion.      Cervical back: Normal range of motion and neck supple.   Skin:     General: Skin is warm.      Capillary Refill: Capillary refill takes less than 2 seconds.   Neurological:      General: No focal deficit present.      Mental Status: She is alert and oriented to person, place, and time. Mental status is at baseline.   Psychiatric:         Mood and Affect: Mood normal.         Thought Content: Thought content normal.         Vital Signs  ED Triage Vitals   Temperature Pulse Respirations Blood Pressure SpO2   06/23/24 1219 06/23/24 1219 06/23/24 1219 06/23/24 1219 06/23/24 1219   98.5 °F (36.9 °C) 89 16 (!) 181/88 99 %      Temp Source Heart Rate Source Patient Position - Orthostatic VS BP Location FiO2 (%)   06/23/24 1219 06/23/24 1219 06/23/24 1219 06/23/24 1219 --   Oral Monitor Sitting Left arm       Pain Score       06/23/24 1402       7           Vitals:    06/23/24 1450 06/23/24 1500 06/23/24 1515 06/23/24 1530   BP: (!) 204/97 (!) 228/105 (!) 203/91 (!) 195/97   Pulse: 67 66 75 76   Patient Position - Orthostatic VS: Lying Lying Lying Lying         Visual Acuity      ED Medications  Medications   sodium chloride 0.9 % bolus 1,000 mL (0 mL Intravenous Stopped 6/23/24 1508)   ondansetron (ZOFRAN) injection 4 mg (4 mg Intravenous Given 6/23/24 1307)   pantoprazole (PROTONIX) injection 40 mg (40 mg Intravenous Given 6/23/24 1308)   potassium chloride (Klor-Con M20) CR tablet 40 mEq (40 mEq Oral Given 6/23/24 1407)   magnesium sulfate 2 g/50 mL IVPB (premix) 2 g (0 g Intravenous Stopped 6/23/24 1508)   labetalol (NORMODYNE) injection 10 mg (10 mg Intravenous Given 6/23/24 1400)   morphine injection 4 mg (4 mg Intravenous Given 6/23/24 1402)   hydrALAZINE (APRESOLINE) injection 10 mg (10 mg Intravenous Given 6/23/24 1507)       Diagnostic Studies  Results Reviewed       Procedure Component Value Units Date/Time     HS Troponin I 2hr [[id number]]  (Normal) Collected: 06/23/24 1411    Lab Status: Final result Specimen: Blood from Hand, Left Updated: 06/23/24 1440     hs TnI 2hr 7 ng/L      Delta 2hr hsTnI 0 ng/L     Ethanol [[id number]]  (Abnormal) Collected: 06/23/24 1234    Lab Status: Final result Specimen: Blood from Arm, Right Updated: 06/23/24 1308     Ethanol Lvl 80 mg/dL     Comprehensive metabolic panel [[id number]]  (Abnormal) Collected: 06/23/24 1234    Lab Status: Final result Specimen: Blood from Arm, Right Updated: 06/23/24 1307     Sodium 137 mmol/L      Potassium 3.0 mmol/L      Chloride 103 mmol/L      CO2 21 mmol/L      ANION GAP 13 mmol/L      BUN 12 mg/dL      Creatinine 0.60 mg/dL      Glucose 123 mg/dL      Calcium 8.3 mg/dL      AST 27 U/L      ALT 15 U/L      Alkaline Phosphatase 99 U/L      Total Protein 7.1 g/dL      Albumin 4.0 g/dL      Total Bilirubin 0.95 mg/dL      eGFR 107 ml/min/1.73sq m     Narrative:      National Kid[employer] Foundation guidelines for Chronic Kidney Disease (CKD):     Stage 1 with normal or high GFR (GFR > 90 mL/min/1.73 square meters)    Stage 2 Mild CKD (GFR = 60-89 mL/min/1.73 square meters)    Stage 3A Moderate CKD (GFR = 45-59 mL/min/1.73 square meters)    Stage 3B Moderate CKD (GFR = 30-44 mL/min/1.73 square meters)    Stage 4 Severe CKD (GFR = 15-29 mL/min/1.73 square meters)    Stage 5 End Stage CKD (GFR <15 mL/min/1.73 square meters)  Note: GFR calculation is accurate only with a steady state creatinine    Lipase [[id number]]  (Normal) Collected: 06/23/24 1234    Lab Status: Final result Specimen: Blood from Arm, Right Updated: 06/23/24 1307     Lipase 37 u/L     Magnesium [[id number]]  (Abnormal) Collected: 06/23/24 1234    Lab Status: Final result Specimen: Blood from Arm, Right Updated: 06/23/24 1307     Magnesium 1.6 mg/dL     HS Troponin 0hr (reflex protocol) [[id number]]  (Normal) Collected: 06/23/24 1234    Lab Status: Final result Specimen: Blood from Arm,  Right Updated: 06/23/24 1303     hs TnI 0hr 7 ng/L     CBC and differential [[id number]]  (Abnormal) Collected: 06/23/24 1234    Lab Status: Final result Specimen: Blood from Arm, Right Updated: 06/23/24 1241     WBC 7.37 Thousand/uL      RBC 4.28 Million/uL      Hemoglobin 13.9 g/dL      Hematocrit 41.9 %      MCV 98 fL      MCH 32.5 pg      MCHC 33.2 g/dL      RDW 13.1 %      MPV 9.0 fL      Platelets 280 Thousands/uL      nRBC 0 /100 WBCs      Segmented % 80 %      Immature Grans % 0 %      Lymphocytes % 11 %      Monocytes % 7 %      Eosinophils Relative 1 %      Basophils Relative 1 %      Absolute Neutrophils 5.88 Thousands/µL      Absolute Immature Grans 0.03 Thousand/uL      Absolute Lymphocytes 0.82 Thousands/µL      Absolute Monocytes 0.53 Thousand/µL      Eosinophils Absolute 0.05 Thousand/µL      Basophils Absolute 0.06 Thousands/µL                    XR chest 1 view portable    (Results Pending)              Procedures  ECG 12 Lead Documentation Only    Date/Time: 6/23/2024 1:30 PM    Performed by: Caroline Morales DO  Authorized by: Caroline Morales DO    ECG reviewed by me, the ED Provider: yes    Patient location:  ED  Previous ECG:     Previous ECG:  Unavailable    Comparison to cardiac monitor: Yes    Interpretation:     Interpretation: non-specific    Rate:     ECG rate assessment: normal    Rhythm:     Rhythm: sinus rhythm    Ectopy:     Ectopy: none    QRS:     QRS axis:  Normal  Conduction:     Conduction: normal    ST segments:     ST segments:  Non-specific  T waves:     T waves: non-specific             ED Course             HEART Risk Score      Flowsheet Row Most Recent Value   Heart Score Risk Calculator    History 0 Filed at: 06/23/2024 1358   ECG 1 Filed at: 06/23/2024 1358   Age 1 Filed at: 06/23/2024 1358   Risk Factors 1 Filed at: 06/23/2024 1358   Troponin 0 Filed at: 06/23/2024 1358   HEART Score 3 Filed at: 06/23/2024 1358                          SBIRT 20yo+      Flowsheet Row Most  Recent Value   Initial Alcohol Screen: US AUDIT-C     1. How often do you have a drink containing alcohol? 3 Filed at: 06/23/2024 1412   2. How many drinks containing alcohol do you have on a typical day you are drinking?  1 Filed at: 06/23/2024 1412   3b. FEMALE Any Age, or MALE 65+: How often do you have 4 or more drinks on one occassion? 0 Filed at: 06/23/2024 1412   Audit-C Score 4 Filed at: 06/23/2024 1412   FORTUNATO: How many times in the past year have you...    Used an illegal drug or used a prescription medication for non-medical reasons? Never Filed at: 06/23/2024 1412                      Medical Decision Making  Patient says she takes blood pressure medications for her HTN. Gave her medications and discharged for her nausea and reflux. Told her to quit alcohol, she declined any resources for alcohol detox.     Problems Addressed:  Chest pain: acute illness or injury  Gastritis: acute illness or injury    Amount and/or Complexity of Data Reviewed  External Data Reviewed: notes.  Labs: ordered. Decision-making details documented in ED Course.  Radiology: ordered and independent interpretation performed. Decision-making details documented in ED Course.     Details: No acute disease  ECG/medicine tests: ordered and independent interpretation performed. Decision-making details documented in ED Course.    Risk  Prescription drug management.             Disposition  Final diagnoses:   Gastritis   Chest pain     Time reflects when diagnosis was documented in both MDM as applicable and the Disposition within this note       Time User Action Codes Description Comment    6/23/2024  2:44 PM Caroline Morales [K29.70] Gastritis     6/23/2024  2:44 PM Caroline Morales Add [R07.9] Chest pain           ED Disposition       ED Disposition   Discharge    Condition   --    Date/Time   Sun Jun 23, 2024 7274    Comment   Nannette Hennessy discharge to home/self care.                   Follow-up Information       Follow up With  Specialties Details Why Contact Info Additional Information    [facility] Emergency Department Emergency Medicine  If symptoms worsen [address] [facility] [id number] [phone] [facility] Emergency Department, [address], [place], [address]            Discharge Medication List as of 6/23/2024  2:45 PM        START taking these medications    Details   ondansetron (ZOFRAN-ODT) 4 mg disintegrating tablet Take 1 tablet (4 mg total) by mouth every 6 (six) hours as needed for nausea for up to 3 days, Starting Sun 6/23/2024, Until Wed 6/26/2024 at 2359, Normal      pantoprazole (PROTONIX) 20 mg tablet Take 1 tablet (20 mg total) by mouth daily, Starting Sun 6/23/2024, Normal           CONTINUE these medications which have NOT CHANGED    Details   !! amLODIPine (NORVASC) 2.5 mg tablet Take 2 tablets (5 mg total) by mouth in the morning., Starting Thu 5/19/2022, Normal      !! amLODIPine (NORVASC) 5 mg tablet Take 1 tablet (5 mg total) by mouth daily, Starting Wed 6/14/2023, Normal      bacitracin topical ointment 500 units/g topical ointment Apply 1 large application topically 2 (two) times a day, Starting Tue 8/3/2021, Normal      chlorthalidone 25 mg tablet Take 0.5 tablets (12.5 mg total) by mouth in the morning., Starting Thu 5/19/2022, Normal      hydrocortisone 1 % cream Apply to affected area 2 times daily, Normal      nicotine (NICODERM CQ) 7 mg/24hr TD 24 hr patch Place 1 patch on the skin every 24 hours, Starting Mon 1/6/2020, Normal      ondansetron (ZOFRAN) 4 mg tablet Take 1 tablet (4 mg total) by mouth every 8 (eight) hours as needed for nausea or vomiting, Starting Tue 6/13/2023, Normal      witch hazel-glycerin (TUCKS) topical pad Insert 1 pad into the rectum as needed for irritation, Starting Mon 1/24/2022, Normal       !! - Potential duplicate medications found. Please discuss with provider.          No discharge procedures on  file.    PDMP Review       None            ED Provider  Electronically Signed by             Caroline Morales,   06/23/24 1639

## 2024-06-23 NOTE — Clinical Note
Nannette Hennessy was seen and treated in our emergency department on 6/23/2024.                Diagnosis:     Nannette  may return to work on return date.    She may return on this date: 06/24/2024         If you have any questions or concerns, please don't hesitate to call.      Rena Coleman RN    ______________________________           _______________          _______________  Hospital Representative                              Date                                Time

## 2024-06-24 LAB
ATRIAL RATE: 74 BPM
P AXIS: 86 DEGREES
PR INTERVAL: 176 MS
QRS AXIS: 83 DEGREES
QRSD INTERVAL: 98 MS
QT INTERVAL: 422 MS
QTC INTERVAL: 468 MS
T WAVE AXIS: 86 DEGREES
VENTRICULAR RATE: 74 BPM

## 2024-06-24 PROCEDURE — 93010 ELECTROCARDIOGRAM REPORT: CPT | Performed by: INTERNAL MEDICINE

## 2024-11-02 ENCOUNTER — HOSPITAL ENCOUNTER (EMERGENCY)
Facility: HOSPITAL | Age: 49
Discharge: LEFT BEFORE DISCHARGE | End: 2024-11-02
Attending: EMERGENCY MEDICINE
Payer: COMMERCIAL

## 2024-11-02 ENCOUNTER — APPOINTMENT (EMERGENCY)
Dept: RADIOLOGY | Facility: HOSPITAL | Age: 49
End: 2024-11-02
Payer: COMMERCIAL

## 2024-11-02 VITALS
SYSTOLIC BLOOD PRESSURE: 222 MMHG | DIASTOLIC BLOOD PRESSURE: 120 MMHG | HEART RATE: 68 BPM | OXYGEN SATURATION: 100 % | RESPIRATION RATE: 30 BRPM

## 2024-11-02 DIAGNOSIS — R55 NEAR SYNCOPE: ICD-10-CM

## 2024-11-02 DIAGNOSIS — I16.0 HYPERTENSIVE URGENCY: Primary | ICD-10-CM

## 2024-11-02 DIAGNOSIS — Z91.148 NONCOMPLIANCE WITH MEDICATION REGIMEN: ICD-10-CM

## 2024-11-02 LAB
2HR DELTA HS TROPONIN: 2 NG/L
ALBUMIN SERPL BCG-MCNC: 4.3 G/DL (ref 3.5–5)
ALP SERPL-CCNC: 86 U/L (ref 34–104)
ALT SERPL W P-5'-P-CCNC: 13 U/L (ref 7–52)
AMPHETAMINES SERPL QL SCN: NEGATIVE
ANION GAP SERPL CALCULATED.3IONS-SCNC: 9 MMOL/L (ref 4–13)
APTT PPP: 32 SECONDS (ref 23–34)
AST SERPL W P-5'-P-CCNC: 19 U/L (ref 13–39)
ATRIAL RATE: 73 BPM
BACTERIA UR QL AUTO: ABNORMAL /HPF
BARBITURATES UR QL: NEGATIVE
BASOPHILS # BLD AUTO: 0.04 THOUSANDS/ΜL (ref 0–0.1)
BASOPHILS NFR BLD AUTO: 1 % (ref 0–1)
BENZODIAZ UR QL: NEGATIVE
BILIRUB SERPL-MCNC: 0.76 MG/DL (ref 0.2–1)
BILIRUB UR QL STRIP: NEGATIVE
BNP SERPL-MCNC: 70 PG/ML (ref 0–100)
BUN SERPL-MCNC: 16 MG/DL (ref 5–25)
CALCIUM SERPL-MCNC: 9.1 MG/DL (ref 8.4–10.2)
CARDIAC TROPONIN I PNL SERPL HS: 10 NG/L
CARDIAC TROPONIN I PNL SERPL HS: 8 NG/L
CHLORIDE SERPL-SCNC: 104 MMOL/L (ref 96–108)
CLARITY UR: CLEAR
CO2 SERPL-SCNC: 24 MMOL/L (ref 21–32)
COCAINE UR QL: NEGATIVE
COLOR UR: COLORLESS
CREAT SERPL-MCNC: 0.7 MG/DL (ref 0.6–1.3)
EOSINOPHIL # BLD AUTO: 0.09 THOUSAND/ΜL (ref 0–0.61)
EOSINOPHIL NFR BLD AUTO: 1 % (ref 0–6)
ERYTHROCYTE [DISTWIDTH] IN BLOOD BY AUTOMATED COUNT: 12.1 % (ref 11.6–15.1)
FENTANYL UR QL SCN: NEGATIVE
GFR SERPL CREATININE-BSD FRML MDRD: 102 ML/MIN/1.73SQ M
GLUCOSE SERPL-MCNC: 79 MG/DL (ref 65–140)
GLUCOSE UR STRIP-MCNC: NEGATIVE MG/DL
HCT VFR BLD AUTO: 42 % (ref 34.8–46.1)
HGB BLD-MCNC: 13.8 G/DL (ref 11.5–15.4)
HGB UR QL STRIP.AUTO: ABNORMAL
HYDROCODONE UR QL SCN: NEGATIVE
IMM GRANULOCYTES # BLD AUTO: 0.02 THOUSAND/UL (ref 0–0.2)
IMM GRANULOCYTES NFR BLD AUTO: 0 % (ref 0–2)
INR PPP: 1.01 (ref 0.85–1.19)
KETONES UR STRIP-MCNC: NEGATIVE MG/DL
LEUKOCYTE ESTERASE UR QL STRIP: NEGATIVE
LYMPHOCYTES # BLD AUTO: 1.79 THOUSANDS/ΜL (ref 0.6–4.47)
LYMPHOCYTES NFR BLD AUTO: 25 % (ref 14–44)
MCH RBC QN AUTO: 31.9 PG (ref 26.8–34.3)
MCHC RBC AUTO-ENTMCNC: 32.9 G/DL (ref 31.4–37.4)
MCV RBC AUTO: 97 FL (ref 82–98)
METHADONE UR QL: NEGATIVE
MONOCYTES # BLD AUTO: 0.79 THOUSAND/ΜL (ref 0.17–1.22)
MONOCYTES NFR BLD AUTO: 11 % (ref 4–12)
NEUTROPHILS # BLD AUTO: 4.33 THOUSANDS/ΜL (ref 1.85–7.62)
NEUTS SEG NFR BLD AUTO: 62 % (ref 43–75)
NITRITE UR QL STRIP: NEGATIVE
NON-SQ EPI CELLS URNS QL MICRO: ABNORMAL /HPF
NRBC BLD AUTO-RTO: 0 /100 WBCS
OPIATES UR QL SCN: NEGATIVE
OXYCODONE+OXYMORPHONE UR QL SCN: NEGATIVE
P AXIS: 73 DEGREES
PCP UR QL: NEGATIVE
PH UR STRIP.AUTO: 7 [PH]
PLATELET # BLD AUTO: 262 THOUSANDS/UL (ref 149–390)
PMV BLD AUTO: 9.7 FL (ref 8.9–12.7)
POTASSIUM SERPL-SCNC: 3.5 MMOL/L (ref 3.5–5.3)
PR INTERVAL: 166 MS
PROT SERPL-MCNC: 7.5 G/DL (ref 6.4–8.4)
PROT UR STRIP-MCNC: ABNORMAL MG/DL
PROTHROMBIN TIME: 13.8 SECONDS (ref 12.3–15)
QRS AXIS: 70 DEGREES
QRSD INTERVAL: 80 MS
QT INTERVAL: 416 MS
QTC INTERVAL: 458 MS
RBC # BLD AUTO: 4.32 MILLION/UL (ref 3.81–5.12)
RBC #/AREA URNS AUTO: ABNORMAL /HPF
SODIUM SERPL-SCNC: 137 MMOL/L (ref 135–147)
SP GR UR STRIP.AUTO: 1.01 (ref 1–1.03)
T WAVE AXIS: 77 DEGREES
THC UR QL: POSITIVE
UROBILINOGEN UR STRIP-ACNC: <2 MG/DL
VENTRICULAR RATE: 73 BPM
WBC # BLD AUTO: 7.06 THOUSAND/UL (ref 4.31–10.16)
WBC #/AREA URNS AUTO: ABNORMAL /HPF

## 2024-11-02 PROCEDURE — 84484 ASSAY OF TROPONIN QUANT: CPT | Performed by: EMERGENCY MEDICINE

## 2024-11-02 PROCEDURE — 70450 CT HEAD/BRAIN W/O DYE: CPT

## 2024-11-02 PROCEDURE — 85610 PROTHROMBIN TIME: CPT | Performed by: EMERGENCY MEDICINE

## 2024-11-02 PROCEDURE — 85025 COMPLETE CBC W/AUTO DIFF WBC: CPT | Performed by: EMERGENCY MEDICINE

## 2024-11-02 PROCEDURE — 85730 THROMBOPLASTIN TIME PARTIAL: CPT | Performed by: EMERGENCY MEDICINE

## 2024-11-02 PROCEDURE — 36415 COLL VENOUS BLD VENIPUNCTURE: CPT | Performed by: EMERGENCY MEDICINE

## 2024-11-02 PROCEDURE — 83880 ASSAY OF NATRIURETIC PEPTIDE: CPT | Performed by: EMERGENCY MEDICINE

## 2024-11-02 PROCEDURE — 71045 X-RAY EXAM CHEST 1 VIEW: CPT

## 2024-11-02 PROCEDURE — 80307 DRUG TEST PRSMV CHEM ANLYZR: CPT | Performed by: EMERGENCY MEDICINE

## 2024-11-02 PROCEDURE — 99285 EMERGENCY DEPT VISIT HI MDM: CPT | Performed by: EMERGENCY MEDICINE

## 2024-11-02 PROCEDURE — 93005 ELECTROCARDIOGRAM TRACING: CPT

## 2024-11-02 PROCEDURE — 99284 EMERGENCY DEPT VISIT MOD MDM: CPT

## 2024-11-02 PROCEDURE — 81001 URINALYSIS AUTO W/SCOPE: CPT | Performed by: EMERGENCY MEDICINE

## 2024-11-02 PROCEDURE — 80053 COMPREHEN METABOLIC PANEL: CPT | Performed by: EMERGENCY MEDICINE

## 2024-11-02 RX ORDER — CLONIDINE HYDROCHLORIDE 0.1 MG/1
0.1 TABLET ORAL ONCE
Status: COMPLETED | OUTPATIENT
Start: 2024-11-02 | End: 2024-11-02

## 2024-11-02 RX ORDER — AMLODIPINE BESYLATE 5 MG/1
10 TABLET ORAL ONCE
Status: COMPLETED | OUTPATIENT
Start: 2024-11-02 | End: 2024-11-02

## 2024-11-02 RX ORDER — CHLORTHALIDONE 25 MG/1
12.5 TABLET ORAL DAILY
Status: DISCONTINUED | OUTPATIENT
Start: 2024-11-03 | End: 2024-11-02

## 2024-11-02 RX ORDER — CHLORTHALIDONE 25 MG/1
12.5 TABLET ORAL ONCE
Status: COMPLETED | OUTPATIENT
Start: 2024-11-02 | End: 2024-11-02

## 2024-11-02 RX ADMIN — CLONIDINE HYDROCHLORIDE 0.1 MG: 0.1 TABLET ORAL at 19:02

## 2024-11-02 RX ADMIN — AMLODIPINE BESYLATE 10 MG: 5 TABLET ORAL at 18:30

## 2024-11-02 RX ADMIN — CHLORTHALIDONE 12.5 MG: 25 TABLET ORAL at 19:02

## 2024-11-02 NOTE — DISCHARGE INSTRUCTIONS
I offered you an admission, as your blood pressure remained persistently elevated, however you declined.  You are being discharged AGAINST MEDICAL ADVICE.  You are at risk of decompensating, becoming disabled or dying.  Return to the ER for further concerns or worsening symptoms  Follow up with your primary care physician and cardiology in 1-2 days

## 2024-11-02 NOTE — ED PROVIDER NOTES
Time reflects when diagnosis was documented in both MDM as applicable and the Disposition within this note       Time User Action Codes Description Comment    11/2/2024  7:35 PM Anastasiia Gray Add [I16.0] Hypertensive urgency     11/2/2024  7:35 PM Oyesanmi, Olubusola O Add [R55] Near syncope     11/2/2024  7:35 PM Oyesanmi Olubusola O Add [Z91.148] Noncompliance with medication regimen           ED Disposition       ED Disposition   AMA    Condition   --    Date/Time   Sat Nov 2, 2024  7:34 PM    Comment   Date: 11/2/2024  Patient: Nannette Hennessy  Admitted: 11/2/2024  4:38 PM  Attending Provider: Anastasiia Gray DO    Nannette Hennessy or her authorized caregiver has made the decision for the patient to leave the emergency department against the adv ice of the emergency department staff. She or her authorized caregiver has been informed and understands the inherent risks, including death, decompensation, disability.  She or her authorized caregiver has decided to accept the responsibility for th is decision. Nannette Hennessy and all necessary parties have been advised that she may return for further evaluation or treatment. Her condition at time of discharge was unchanged.  Nannette Hennessy had current vital signs as follows:  BP (!) 239/113    Pulse 70   Resp 17                Assessment & Plan       Medical Decision Making  49-year-old female in ED after a near syncopal episode, found to be significantly hypertensive.  Patient is noncompliant with her medications.  CBC, CMP, EKG, chest x-ray, urinalysis ordered and reviewed to evaluate for signs of endorgan damage, CHF, ACS.  Administered patient's home meds, and a dose of clonidine, without significant improvement.  I offered patient an admission for IV antihypertensive, however she declines and will be discharged AGAINST MEDICAL ADVICE.  Patient understands the risks of leaving, including decompensation, disability, death.  Patient will follow-up with  her primary care physician and return to emergency department for further concerns.    Amount and/or Complexity of Data Reviewed  Labs: ordered.  Radiology: ordered and independent interpretation performed.    Risk  Prescription drug management.        ED Course as of 11/03/24 1458   Sat Nov 02, 2024 1703 235/119   1933 222/120.  I offered patient an admission, as her blood pressure remains persistently elevated despite p.o. meds.  I offered to start patient on an IV antihypertensive, and she declines, stating that she would like to go home.  Delta troponin pending.  Patient will be discharged AGAINST MEDICAL ADVICE.       Medications   amLODIPine (NORVASC) tablet 10 mg (10 mg Oral Given 11/2/24 1830)   chlorthalidone tablet 12.5 mg (12.5 mg Oral Given 11/2/24 1902)   cloNIDine (CATAPRES) tablet 0.1 mg (0.1 mg Oral Given 11/2/24 1902)       ED Risk Strat Scores   HEART Risk Score      Flowsheet Row Most Recent Value   Heart Score Risk Calculator    History 0 Filed at: 11/02/2024 1928   ECG 0 Filed at: 11/02/2024 1928   Age 1 Filed at: 11/02/2024 1928   Risk Factors 0 Filed at: 11/02/2024 1928   Troponin 0 Filed at: 11/02/2024 1928   HEART Score 1 Filed at: 11/02/2024 1928                               SBIRT 20yo+      Flowsheet Row Most Recent Value   Initial Alcohol Screen: US AUDIT-C     1. How often do you have a drink containing alcohol? 3 Filed at: 11/02/2024 1703   2. How many drinks containing alcohol do you have on a typical day you are drinking?  2 Filed at: 11/02/2024 1703   3b. FEMALE Any Age, or MALE 65+: How often do you have 4 or more drinks on one occassion? 1 Filed at: 11/02/2024 1703   Audit-C Score 6 Filed at: 11/02/2024 1703   FORTUNATO: How many times in the past year have you...    Used an illegal drug or used a prescription medication for non-medical reasons? Never Filed at: 11/02/2024 1703                            History of Present Illness       Chief Complaint   Patient presents with     "Dizziness     Pt reports having episode of dizziness at work, with blurry vision and near syncopal episode. BP very elevated in triage       Past Medical History:   Diagnosis Date    Hypertension       History reviewed. No pertinent surgical history.   Family History   Problem Relation Age of Onset    Cancer Mother     Asthma Mother     Diabetes Mother       Social History     Tobacco Use    Smoking status: Every Day     Types: Cigarettes    Smokeless tobacco: Never    Tobacco comments:     3 per day   Vaping Use    Vaping status: Never Used   Substance Use Topics    Alcohol use: Yes     Comment: \"a few beers a night\"    Drug use: Yes     Types: Marijuana     Comment: \"I smoke a bunch of weed\"      E-Cigarette/Vaping    E-Cigarette Use Never User       E-Cigarette/Vaping Substances    Nicotine No     THC No     CBD No     Flavoring No     Other No     Unknown No       I have reviewed and agree with the history as documented.     Patient is a 49-year-old female with a history of hypertension, that presents emergency department with complaint of lightheadedness.  Patient states that symptoms have been intermittent for many months.  Patient had a near syncopal episode at work, and was sent to the ED for evaluation.  She admits that she is not compliant with her antihypertensives and thinks that she took the most recent dose on Monday.  She denies chest pain, shortness of breath.      History provided by:  Patient   used: No        Review of Systems   Constitutional:  Negative for chills and fever.   Respiratory:  Negative for cough, chest tightness and shortness of breath.    Gastrointestinal:  Negative for abdominal pain, diarrhea, nausea and vomiting.   Genitourinary:  Negative for dysuria, frequency, hematuria and urgency.   Musculoskeletal:  Negative for back pain, neck pain and neck stiffness.   Neurological:  Positive for light-headedness. Negative for dizziness, facial asymmetry and " headaches.   Psychiatric/Behavioral:  The patient is nervous/anxious.    All other systems reviewed and are negative.          Objective       ED Triage Vitals [11/02/24 1638]   Temp Pulse Blood Pressure Respirations SpO2 Patient Position - Orthostatic VS   -- 83 (!) 270/133 18 100 % Sitting      Temp src Heart Rate Source BP Location FiO2 (%) Pain Score    -- Monitor Right arm -- --      Vitals      Date and Time Temp Pulse SpO2 Resp BP Pain Score FACES Pain Rating User   11/02/24 1930 -- 68 100 % 30 222/120 -- -- [facility]   11/02/24 1915 -- 65 100 % 20 226/113 -- -- [facility]   11/02/24 1902 -- 78 100 % 48 239/113 -- -- [facility]   11/02/24 1900 -- 69 100 % 21 239/113 -- -- [facility]   11/02/24 1845 -- 64 100 % 25 244/117 -- -- [facility]   11/02/24 1830 -- 74 100 % 39 234/128 -- -- [facility]   11/02/24 1815 -- 70 99 % 17 256/120 -- -- [facility]   11/02/24 1715 -- 82 100 % 42 237/129 -- -- [facility]   11/02/24 1700 -- 73 100 % 14 235/119 -- -- [facility]   11/02/24 1638 -- 83 100 % 18 270/133 -- -- [facility]            Physical Exam  Vitals and nursing note reviewed.   Constitutional:       General: She is not in acute distress.     Appearance: She is well-developed. She is not diaphoretic.   HENT:      Head: Normocephalic and atraumatic.   Eyes:      Conjunctiva/sclera: Conjunctivae normal.      Pupils: Pupils are equal, round, and reactive to light.   Cardiovascular:      Rate and Rhythm: Normal rate and regular rhythm.      Heart sounds: Normal heart sounds. No murmur heard.  Pulmonary:      Effort: Pulmonary effort is normal. No respiratory distress.      Breath sounds: Normal breath sounds.   Abdominal:      General: Bowel sounds are normal. There is no distension.      Palpations: Abdomen is soft.      Tenderness: There is no abdominal tenderness.   Musculoskeletal:         General: No deformity. Normal range of motion.      Cervical back: Normal range of motion and neck supple.   Skin:     General: Skin is warm and dry.      Capillary Refill: Capillary refill takes  less than 2 seconds.      Coloration: Skin is not pale.      Findings: No rash.   Neurological:      General: No focal deficit present.      Mental Status: She is alert and oriented to person, place, and time.      Cranial Nerves: No cranial nerve deficit.   Psychiatric:         Behavior: Behavior normal.         Results Reviewed       Procedure Component Value Units Date/Time    HS Troponin I 2hr [[id number]]  (Normal) Collected: 11/02/24 1906    Lab Status: Final result Specimen: Blood from Arm, Right Updated: 11/02/24 1934     hs TnI 2hr 10 ng/L      Delta 2hr hsTnI 2 ng/L     Rapid drug screen, urine [[id number]]  (Abnormal) Collected: 11/02/24 1715    Lab Status: Final result Specimen: Urine, Clean Catch Updated: 11/02/24 1737     Amph/Meth UR Negative     Barbiturate Ur Negative     Benzodiazepine Urine Negative     Cocaine Urine Negative     Methadone Urine Negative     Opiate Urine Negative     PCP Ur Negative     THC Urine Positive     Oxycodone Urine Negative     Fentanyl Urine Negative     HYDROCODONE URINE Negative    Narrative:      Presumptive report. If requested, specimen will be sent to reference lab for confirmation.  FOR MEDICAL PURPOSES ONLY.   IF CONFIRMATION NEEDED PLEASE CONTACT THE LAB WITHIN 5 DAYS.    Drug Screen Cutoff Levels:  AMPHETAMINE/METHAMPHETAMINES  1000 ng/mL  BARBITURATES     200 ng/mL  BENZODIAZEPINES     200 ng/mL  COCAINE      300 ng/mL  METHADONE      300 ng/mL  OPIATES      300 ng/mL  PHENCYCLIDINE     25 ng/mL  THC       50 ng/mL  OXYCODONE      100 ng/mL  FENTANYL      5 ng/mL  HYDROCODONE     300 ng/mL    Urine Microscopic [[id number]]  (Abnormal) Collected: 11/02/24 1715    Lab Status: Final result Specimen: Urine, Clean Catch Updated: 11/02/24 1732     RBC, UA 0-5 /hpf      WBC, UA None Seen /hpf      Epithelial Cells Occasional /hpf      Bacteria, UA Moderate /hpf     HS Troponin 0hr (reflex protocol) [[id number]]  (Normal) Collected: 11/02/24 1654    Lab Status: Final  result Specimen: Blood from Arm, Right Updated: 11/02/24 1726     hs TnI 0hr 8 ng/L     B-Type Natriuretic Peptide(BNP) [[id number]]  (Normal) Collected: 11/02/24 1654    Lab Status: Final result Specimen: Blood from Arm, Right Updated: 11/02/24 1726     BNP 70 pg/mL     Comprehensive metabolic panel [[id number]] Collected: 11/02/24 1654    Lab Status: Final result Specimen: Blood from Arm, Right Updated: 11/02/24 1723     Sodium 137 mmol/L      Potassium 3.5 mmol/L      Chloride 104 mmol/L      CO2 24 mmol/L      ANION GAP 9 mmol/L      BUN 16 mg/dL      Creatinine 0.70 mg/dL      Glucose 79 mg/dL      Calcium 9.1 mg/dL      AST 19 U/L      ALT 13 U/L      Alkaline Phosphatase 86 U/L      Total Protein 7.5 g/dL      Albumin 4.3 g/dL      Total Bilirubin 0.76 mg/dL      eGFR 102 ml/min/1.73sq m     Narrative:      [employer] guidelines for Chronic Kidney Disease (CKD):     Stage 1 with normal or high GFR (GFR > 90 mL/min/1.73 square meters)    Stage 2 Mild CKD (GFR = 60-89 mL/min/1.73 square meters)    Stage 3A Moderate CKD (GFR = 45-59 mL/min/1.73 square meters)    Stage 3B Moderate CKD (GFR = 30-44 mL/min/1.73 square meters)    Stage 4 Severe CKD (GFR = 15-29 mL/min/1.73 square meters)    Stage 5 End Stage CKD (GFR <15 mL/min/1.73 square meters)  Note: GFR calculation is accurate only with a steady state creatinine    UA (URINE) with reflex to Scope [[id number]]  (Abnormal) Collected: 11/02/24 1715    Lab Status: Final result Specimen: Urine, Clean Catch Updated: 11/02/24 1721     Color, UA Colorless     Clarity, UA Clear     Specific Gravity, UA 1.015     pH, UA 7.0     Leukocytes, UA Negative     Nitrite, UA Negative     Protein, UA Trace mg/dl      Glucose, UA Negative mg/dl      Ketones, UA Negative mg/dl      Urobilinogen, UA <2.0 mg/dl      Bilirubin, UA Negative     Occult Blood, UA Trace    Protime-INR [[id number]]  (Normal) Collected: 11/02/24 1654    Lab Status: Final result Specimen:  Blood from Arm, Right Updated: 11/02/24 1715     Protime 13.8 seconds      INR 1.01    Narrative:      INR Therapeutic Range    Indication                                             INR Range      Atrial Fibrillation                                               2.0-3.0  Hypercoagulable State                                    2.0.2.3  Left Ventricular Asist Device                            2.0-3.0  Mechanical Heart Valve                                  -    Aortic(with afib, MI, embolism, HF, LA enlargement,    and/or coagulopathy)                                     2.0-3.0 (2.5-3.5)     Mitral                                                             2.5-3.5  Prosthetic/Bioprosthetic Heart Valve               2.0-3.0  Venous thromboembolism (VTE: VT, PE        2.0-3.0    APTT [[id number]]  (Normal) Collected: 11/02/24 1654    Lab Status: Final result Specimen: Blood from Arm, Right Updated: 11/02/24 1715     PTT 32 seconds     CBC and differential [[id number]] Collected: 11/02/24 1654    Lab Status: Final result Specimen: Blood from Arm, Right Updated: 11/02/24 1702     WBC 7.06 Thousand/uL      RBC 4.32 Million/uL      Hemoglobin 13.8 g/dL      Hematocrit 42.0 %      MCV 97 fL      MCH 31.9 pg      MCHC 32.9 g/dL      RDW 12.1 %      MPV 9.7 fL      Platelets 262 Thousands/uL      nRBC 0 /100 WBCs      Segmented % 62 %      Immature Grans % 0 %      Lymphocytes % 25 %      Monocytes % 11 %      Eosinophils Relative 1 %      Basophils Relative 1 %      Absolute Neutrophils 4.33 Thousands/µL      Absolute Immature Grans 0.02 Thousand/uL      Absolute Lymphocytes 1.79 Thousands/µL      Absolute Monocytes 0.79 Thousand/µL      Eosinophils Absolute 0.09 Thousand/µL      Basophils Absolute 0.04 Thousands/µL             CT head without contrast   Final Interpretation by Cesar Dunham MD (11/02 1843)      No acute intracranial hemorrhage, mass effect or midline shift.                  Workstation performed:  [id number]         XR chest 1 view portable   ED Interpretation by Anastasiia Gray DO (11/02 1816)   nad      Final Interpretation by Lise Stone MD (11/03 0705)      No acute cardiopulmonary disease.            Workstation performed: [id number]             ECG 12 Lead Documentation Only    Date/Time: 11/2/2024 4:45 PM    Performed by: Anastasiia Gray DO  Authorized by: Anastasiia Gray DO    Indications / Diagnosis:  HTN  ECG reviewed by me, the ED Provider: yes    Patient location:  ED  Previous ECG:     Previous ECG:  Unavailable    Comparison to cardiac monitor: Yes    Interpretation:     Interpretation: non-specific    Rate:     ECG rate:  73bpm    ECG rate assessment: normal    Rhythm:     Rhythm: sinus rhythm    Ectopy:     Ectopy: none    QRS:     QRS axis:  Normal  Conduction:     Conduction: normal    ST segments:     ST segments:  Normal  T waves:     T waves: normal        ED Medication and Procedure Management   Prior to Admission Medications   Prescriptions Last Dose Informant Patient Reported? Taking?   amLODIPine (NORVASC) 2.5 mg tablet   No No   Sig: Take 2 tablets (5 mg total) by mouth in the morning.   Patient not taking: Reported on 6/13/2023   amLODIPine (NORVASC) 5 mg tablet   No No   Sig: Take 1 tablet (5 mg total) by mouth daily   bacitracin topical ointment 500 units/g topical ointment   No No   Sig: Apply 1 large application topically 2 (two) times a day   Patient not taking: Reported on 6/13/2023   chlorthalidone 25 mg tablet   No No   Sig: Take 0.5 tablets (12.5 mg total) by mouth in the morning.   Patient not taking: Reported on 6/13/2023   hydrocortisone 1 % cream   No No   Sig: Apply to affected area 2 times daily   Patient not taking: Reported on 6/13/2023   nicotine (NICODERM CQ) 7 mg/24hr TD 24 hr patch   No No   Sig: Place 1 patch on the skin every 24 hours   Patient not taking: Reported on 5/19/2022   ondansetron (ZOFRAN) 4 mg tablet   No No   Sig: Take 1  tablet (4 mg total) by mouth every 8 (eight) hours as needed for nausea or vomiting   ondansetron (ZOFRAN-ODT) 4 mg disintegrating tablet   No No   Sig: Take 1 tablet (4 mg total) by mouth every 6 (six) hours as needed for nausea for up to 3 days   pantoprazole (PROTONIX) 20 mg tablet   No No   Sig: Take 1 tablet (20 mg total) by mouth daily   witch hazel-glycerin (TUCKS) topical pad   No No   Sig: Insert 1 pad into the rectum as needed for irritation   Patient not taking: Reported on 5/19/2022      Facility-Administered Medications: None     Discharge Medication List as of 11/2/2024  7:37 PM        CONTINUE these medications which have NOT CHANGED    Details   !! amLODIPine (NORVASC) 2.5 mg tablet Take 2 tablets (5 mg total) by mouth in the morning., Starting Thu 5/19/2022, Normal      !! amLODIPine (NORVASC) 5 mg tablet Take 1 tablet (5 mg total) by mouth daily, Starting Wed 6/14/2023, Normal      bacitracin topical ointment 500 units/g topical ointment Apply 1 large application topically 2 (two) times a day, Starting Tue 8/3/2021, Normal      chlorthalidone 25 mg tablet Take 0.5 tablets (12.5 mg total) by mouth in the morning., Starting Thu 5/19/2022, Normal      hydrocortisone 1 % cream Apply to affected area 2 times daily, Normal      nicotine (NICODERM CQ) 7 mg/24hr TD 24 hr patch Place 1 patch on the skin every 24 hours, Starting Mon 1/6/2020, Normal      ondansetron (ZOFRAN) 4 mg tablet Take 1 tablet (4 mg total) by mouth every 8 (eight) hours as needed for nausea or vomiting, Starting Tue 6/13/2023, Normal      ondansetron (ZOFRAN-ODT) 4 mg disintegrating tablet Take 1 tablet (4 mg total) by mouth every 6 (six) hours as needed for nausea for up to 3 days, Starting Sun 6/23/2024, Until Wed 6/26/2024 at 2359, Normal      pantoprazole (PROTONIX) 20 mg tablet Take 1 tablet (20 mg total) by mouth daily, Starting Sun 6/23/2024, Normal      witch hazel-glycerin (TUCKS) topical pad Insert 1 pad into the rectum as  needed for irritation, Starting Mon 1/24/2022, Normal       !! - Potential duplicate medications found. Please discuss with provider.        No discharge procedures on file.  ED SEPSIS DOCUMENTATION   Time reflects when diagnosis was documented in both MDM as applicable and the Disposition within this note       Time User Action Codes Description Comment    11/2/2024  7:35 PM Oyesanmi, Olubusola O Add [I16.0] Hypertensive urgency     11/2/2024  7:35 PM Oyesanmi, Olubusola O Add [R55] Near syncope     11/2/2024  7:35 PM Oyesanmi, Olubusola O Add [Z91.148] Noncompliance with medication regimen                  Anastasiia Gray, DO  11/03/24 1500

## 2024-11-04 ENCOUNTER — OFFICE VISIT (OUTPATIENT)
Dept: URGENT CARE | Facility: CLINIC | Age: 49
End: 2024-11-04
Payer: COMMERCIAL

## 2024-11-04 VITALS
RESPIRATION RATE: 16 BRPM | OXYGEN SATURATION: 100 % | SYSTOLIC BLOOD PRESSURE: 142 MMHG | WEIGHT: 105 LBS | TEMPERATURE: 97.4 F | DIASTOLIC BLOOD PRESSURE: 94 MMHG | BODY MASS INDEX: 17.47 KG/M2 | HEART RATE: 84 BPM

## 2024-11-04 DIAGNOSIS — I10 ESSENTIAL HYPERTENSION: Primary | ICD-10-CM

## 2024-11-04 PROCEDURE — 99213 OFFICE O/P EST LOW 20 MIN: CPT | Performed by: PHYSICIAN ASSISTANT

## 2024-11-04 RX ORDER — AMLODIPINE BESYLATE 5 MG/1
5 TABLET ORAL DAILY
Qty: 14 TABLET | Refills: 0 | Status: SHIPPED | OUTPATIENT
Start: 2024-11-04

## 2024-11-04 NOTE — LETTER
November 4, 2024     Patient: Nannette Hennessy  YOB: 1975  Date of Visit: 11/4/2024      To Whom it May Concern:    Nannette Hennessy is under my professional care. Nannette was seen in my office on 11/4/2024. Nannette may return to work on 11/6/24 .    If you have any questions or concerns, please don't hesitate to call.         Sincerely,          Valerie Dockery PA-C        CC: No Recipients

## 2024-11-04 NOTE — PATIENT INSTRUCTIONS
"Patient Education     High blood pressure emergencies   The Basics   Written by the doctors and editors at [facility]   What is a high blood pressure emergency? -- A high blood pressure emergency is a serious condition that can happen when a person's blood pressure gets much higher than normal. When a person's blood pressure gets very high, it can lead to problems in 1 or more of the following organs:   Eyes - Problems can include bleeding in the back of the eye, or swelling of the nerve that runs from the eye to the brain.   Brain - Problems can include swelling or bleeding in the brain, or a stroke. A stroke is when a part of the brain is damaged because of a problem with blood flow.   Kidneys - Very high blood pressure can lead to kidney failure, which is when the kidneys stop working.   Heart - Heart problems can include a heart attack, heart failure, or damage to a major blood vessel.  Without treatment right away, these problems can lead to death.  When your doctor or nurse tells you your blood pressure, they say 2 numbers. For example, your doctor might say that your blood pressure is \"140 over 90.\" When people have a high blood pressure emergency, their blood pressure is usually \"180 over 120\" or higher.  Other terms doctors might use for a high blood pressure emergency are \"hypertensive emergency\" or \"malignant hypertension.\"  Sometimes, a person's blood pressure is much higher than normal, but it hasn't damaged any organs. Doctors call this \"hypertensive urgency.\" Hypertensive urgency is not usually treated the same as a high blood pressure emergency.  What are the symptoms of a high blood pressure emergency? -- The symptoms depend on the organ or organs affected. They can include:   Blurry vision or other vision changes   Headache   Nausea or vomiting   Confusion   Passing out or seizures - Seizures are waves of abnormal electrical activity in the brain that can make people move or behave strangely.   " "Weakness or numbness on 1 side of the body, or in 1 arm or leg   Difficulty talking   Trouble breathing   Chest pain   Pain in the upper back or between the shoulders   Brown or bloody urine   Pain in the lower back or 1 the side of the body  Will I need tests? -- Yes. Your doctor or nurse will ask about your symptoms, do an exam, and check your blood pressure. They might use a special light to look in the back of your eyes.  Your doctor will also do tests to check how serious your condition is. Tests can include:   Blood tests   Urine tests   Chest X-ray   CT scan or other imaging test of your brain - Imaging tests create pictures of the inside of the body.   CT scan or other imaging test of your chest   Electrocardiogram (\"ECG\") - This test measures the electrical activity in your heart (figure 1).  How is a high blood pressure emergency treated? -- A high blood pressure emergency is treated in the hospital. Your doctor will give you medicines to lower your blood pressure quickly. These medicines are usually given through a thin tube that goes into your vein, called an \"IV.\"  Your doctor will also treat any problems caused by your very high blood pressure, if possible.  People who have a high blood pressure emergency usually need long-term treatment to keep their blood pressure under control. This usually includes:   Taking medicines   Following a low-salt diet that includes a lot of fruits and vegetables   Losing weight (if you are overweight)   Getting regular exercise  When should I call the doctor? -- Call your doctor or nurse right away if you have the symptoms listed above, especially if you know that you have high blood pressure.  Some people check their blood pressure at home using a home blood pressure meter. If you do this, call your doctor if you have 2 or more readings higher than 180 over 120. You should call even if you don't have any other symptoms.  All topics are updated as new evidence becomes " "available and our peer review process is complete.  This topic retrieved from [employer] on: Feb 26, 2024.  Topic 31252 Version 12.0  Release: 32.2.4 - C32.56  © 2024 UpToDate, Inc. and/or its affiliates. All rights reserved.  figure 1: Person having an ECG     This drawing shows a person having an ECG (also called an electrocardiogram or EKG). There are patches, called \"electrodes,\" stuck onto the chest, arms, and legs. Wires run from the electrodes to the ECG machine. An ECG measures the electrical activity in the heart.  Graphic 28522 Version 3.0  Consumer Information Use and Disclaimer   Disclaimer: This generalized information is a limited summary of diagnosis, treatment, and/or medication information. It is not meant to be comprehensive and should be used as a tool to help the user understand and/or assess potential diagnostic and treatment options. It does NOT include all information about conditions, treatments, medications, side effects, or risks that may apply to a specific patient. It is not intended to be medical advice or a substitute for the medical advice, diagnosis, or treatment of a health care provider based on the health care provider's examination and assessment of a patient's specific and unique circumstances. Patients must speak with a health care provider for complete information about their health, medical questions, and treatment options, including any risks or benefits regarding use of medications. This information does not endorse any treatments or medications as safe, effective, or approved for treating a specific patient. UpToDate, Inc. and its affiliates disclaim any warranty or liability relating to this information or the use thereof.The use of this information is governed by the Terms of Use, available at https://www.woltersAgileuwer.com/en/know/clinical-effectiveness-terms. 2024© UpToDate, Inc. and its affiliates and/or licensors. All rights reserved.  Copyright   © 2024 UpToDate, Inc. " and/or its affiliates. All rights reserved.

## 2024-11-04 NOTE — PROGRESS NOTES
"  [facility]        NAME: Nannette eHnnessy is a 49 y.o. female  : 1975    MRN: [id number]  DATE: 2024  TIME: 2:21 PM    Assessment and Plan   Essential hypertension [I10]  1. Essential hypertension  amLODIPine (NORVASC) 5 mg tablet        Patient has an appointment with her family doctor in 2 days.  I did send in 14 days of her blood pressure medication so she may take that.  We discussed that with any new or worsening symptoms she needs to go back to the emergency room.    Patient Instructions     Patient Instructions   Patient Education     High blood pressure emergencies   The Basics   Written by the doctors and editors at [facility]   What is a high blood pressure emergency? -- A high blood pressure emergency is a serious condition that can happen when a person's blood pressure gets much higher than normal. When a person's blood pressure gets very high, it can lead to problems in 1 or more of the following organs:   Eyes - Problems can include bleeding in the back of the eye, or swelling of the nerve that runs from the eye to the brain.   Brain - Problems can include swelling or bleeding in the brain, or a stroke. A stroke is when a part of the brain is damaged because of a problem with blood flow.   Kidneys - Very high blood pressure can lead to kidney failure, which is when the kidneys stop working.   Heart - Heart problems can include a heart attack, heart failure, or damage to a major blood vessel.  Without treatment right away, these problems can lead to death.  When your doctor or nurse tells you your blood pressure, they say 2 numbers. For example, your doctor might say that your blood pressure is \"140 over 90.\" When people have a high blood pressure emergency, their blood pressure is usually \"180 over 120\" or higher.  Other terms doctors might use for a high blood pressure emergency are \"hypertensive emergency\" or \"malignant hypertension.\"  Sometimes, a person's blood pressure " "is much higher than normal, but it hasn't damaged any organs. Doctors call this \"hypertensive urgency.\" Hypertensive urgency is not usually treated the same as a high blood pressure emergency.  What are the symptoms of a high blood pressure emergency? -- The symptoms depend on the organ or organs affected. They can include:   Blurry vision or other vision changes   Headache   Nausea or vomiting   Confusion   Passing out or seizures - Seizures are waves of abnormal electrical activity in the brain that can make people move or behave strangely.   Weakness or numbness on 1 side of the body, or in 1 arm or leg   Difficulty talking   Trouble breathing   Chest pain   Pain in the upper back or between the shoulders   Brown or bloody urine   Pain in the lower back or 1 the side of the body  Will I need tests? -- Yes. Your doctor or nurse will ask about your symptoms, do an exam, and check your blood pressure. They might use a special light to look in the back of your eyes.  Your doctor will also do tests to check how serious your condition is. Tests can include:   Blood tests   Urine tests   Chest X-ray   CT scan or other imaging test of your brain - Imaging tests create pictures of the inside of the body.   CT scan or other imaging test of your chest   Electrocardiogram (\"ECG\") - This test measures the electrical activity in your heart (figure 1).  How is a high blood pressure emergency treated? -- A high blood pressure emergency is treated in the hospital. Your doctor will give you medicines to lower your blood pressure quickly. These medicines are usually given through a thin tube that goes into your vein, called an \"IV.\"  Your doctor will also treat any problems caused by your very high blood pressure, if possible.  People who have a high blood pressure emergency usually need long-term treatment to keep their blood pressure under control. This usually includes:   Taking medicines   Following a low-salt diet that " "includes a lot of fruits and vegetables   Losing weight (if you are overweight)   Getting regular exercise  When should I call the doctor? -- Call your doctor or nurse right away if you have the symptoms listed above, especially if you know that you have high blood pressure.  Some people check their blood pressure at home using a home blood pressure meter. If you do this, call your doctor if you have 2 or more readings higher than 180 over 120. You should call even if you don't have any other symptoms.  All topics are updated as new evidence becomes available and our peer review process is complete.  This topic retrieved from [employer] on: Feb 26, 2024.  Topic 78580 Version 12.0  Release: 32.2.4 - C32.56  © 2024 UpToDate, Inc. and/or its affiliates. All rights reserved.  figure 1: Person having an ECG     This drawing shows a person having an ECG (also called an electrocardiogram or EKG). There are patches, called \"electrodes,\" stuck onto the chest, arms, and legs. Wires run from the electrodes to the ECG machine. An ECG measures the electrical activity in the heart.  Graphic 21366 Version 3.0  Consumer Information Use and Disclaimer   Disclaimer: This generalized information is a limited summary of diagnosis, treatment, and/or medication information. It is not meant to be comprehensive and should be used as a tool to help the user understand and/or assess potential diagnostic and treatment options. It does NOT include all information about conditions, treatments, medications, side effects, or risks that may apply to a specific patient. It is not intended to be medical advice or a substitute for the medical advice, diagnosis, or treatment of a health care provider based on the health care provider's examination and assessment of a patient's specific and unique circumstances. Patients must speak with a health care provider for complete information about their health, medical questions, and treatment options, including " any risks or benefits regarding use of medications. This information does not endorse any treatments or medications as safe, effective, or approved for treating a specific patient. UpToDate, Inc. and its affiliates disclaim any warranty or liability relating to this information or the use thereof.The use of this information is governed by the Terms of Use, available at https://www.V[employer].com/en/know/clinical-effectiveness-terms. 2024© UpToDate, Inc. and its affiliates and/or licensors. All rights reserved.  Copyright   © 2024 UpToDate, Inc. and/or its affiliates. All rights reserved.        Follow up with PCP in 3-5 days.  Proceed to  ER if symptoms worsen.    Chief Complaint     Chief Complaint   Patient presents with    Hypertension     Pt presents with dizziness, headache// states she is out of her BP medication// ER on Saturday         History of Present Illness       Patient is a 49-year-old female presenting today for a blood pressure check.  Reports she was seen in the ED on 11/2/2024 for an elevated blood pressure.  She ended up leaving AMA.  She reports that she has been out of her blood pressure medication.  She is supposed be taking amlodipine 5 mg daily.  She reports this morning feeling lightheaded which prompted her to come in.  She is currently denying any symptoms.  Reports that she has an appointment with her family doctor in 2 days from today.        Review of Systems   Review of Systems   Constitutional:  Negative for activity change, appetite change, chills, fatigue and fever.   HENT:  Negative for congestion, ear pain, rhinorrhea, sinus pressure, sinus pain and sore throat.    Eyes:  Negative for pain and visual disturbance.   Respiratory:  Negative for cough, chest tightness and shortness of breath.    Cardiovascular:  Negative for chest pain and palpitations.   Gastrointestinal:  Negative for abdominal pain, diarrhea, nausea and vomiting.   Genitourinary:  Negative for dysuria and  hematuria.   Musculoskeletal:  Negative for arthralgias, back pain and myalgias.   Skin:  Negative for color change, pallor and rash.   Neurological:  Negative for seizures, syncope and headaches.   All other systems reviewed and are negative.        Current Medications       Current Outpatient Medications:     amLODIPine (NORVASC) 5 mg tablet, Take 1 tablet (5 mg total) by mouth daily, Disp: 30 tablet, Rfl: 0    amLODIPine (NORVASC) 5 mg tablet, Take 1 tablet (5 mg total) by mouth daily, Disp: 14 tablet, Rfl: 0    amLODIPine (NORVASC) 2.5 mg tablet, Take 2 tablets (5 mg total) by mouth in the morning. (Patient not taking: Reported on 6/13/2023), Disp: 30 tablet, Rfl: 3    bacitracin topical ointment 500 units/g topical ointment, Apply 1 large application topically 2 (two) times a day (Patient not taking: Reported on 6/13/2023), Disp: 28 g, Rfl: 0    chlorthalidone 25 mg tablet, Take 0.5 tablets (12.5 mg total) by mouth in the morning. (Patient not taking: Reported on 6/13/2023), Disp: 30 tablet, Rfl: 5    hydrocortisone 1 % cream, Apply to affected area 2 times daily (Patient not taking: Reported on 6/13/2023), Disp: 15 g, Rfl: 0    nicotine (NICODERM CQ) 7 mg/24hr TD 24 hr patch, Place 1 patch on the skin every 24 hours (Patient not taking: Reported on 5/19/2022), Disp: 28 patch, Rfl: 0    ondansetron (ZOFRAN) 4 mg tablet, Take 1 tablet (4 mg total) by mouth every 8 (eight) hours as needed for nausea or vomiting (Patient not taking: Reported on 11/4/2024), Disp: 20 tablet, Rfl: 0    ondansetron (ZOFRAN-ODT) 4 mg disintegrating tablet, Take 1 tablet (4 mg total) by mouth every 6 (six) hours as needed for nausea for up to 3 days, Disp: 9 tablet, Rfl: 0    pantoprazole (PROTONIX) 20 mg tablet, Take 1 tablet (20 mg total) by mouth daily (Patient not taking: Reported on 11/4/2024), Disp: 20 tablet, Rfl: 0    witch hazel-glycerin (TUCKS) topical pad, Insert 1 pad into the rectum as needed for irritation (Patient not  taking: Reported on 5/19/2022), Disp: 20 each, Rfl: 0    Current Allergies     Allergies as of 11/04/2024    (No Known Allergies)            The following portions of the patient's history were reviewed and updated as appropriate: allergies, current medications, past family history, past medical history, past social history, past surgical history and problem list.     Past Medical History:   Diagnosis Date    Hypertension        History reviewed. No pertinent surgical history.    Family History   Problem Relation Age of Onset    Cancer Mother     Asthma Mother     Diabetes Mother          Medications have been verified.        Objective   /94   Pulse 84   Temp (!) 97.4 °F (36.3 °C)   Resp 16   Wt 47.6 kg (105 lb)   SpO2 100%   BMI 17.47 kg/m²        Physical Exam     Physical Exam  Constitutional:       General: She is not in acute distress.     Appearance: Normal appearance. She is normal weight. She is not ill-appearing, toxic-appearing or diaphoretic.   HENT:      Head: Normocephalic and atraumatic.   Cardiovascular:      Rate and Rhythm: Normal rate and regular rhythm.      Heart sounds: Normal heart sounds. No murmur heard.     No friction rub. No gallop.   Pulmonary:      Effort: Pulmonary effort is normal. No respiratory distress.      Breath sounds: Normal breath sounds. No stridor. No wheezing, rhonchi or rales.   Chest:      Chest wall: No tenderness.   Abdominal:      General: Abdomen is flat. Bowel sounds are normal. There is no distension.      Palpations: Abdomen is soft. There is no mass.      Tenderness: There is no abdominal tenderness. There is no guarding or rebound.      Hernia: No hernia is present.   Skin:     General: Skin is warm and dry.      Capillary Refill: Capillary refill takes less than 2 seconds.   Neurological:      Mental Status: She is alert.

## 2024-11-05 ENCOUNTER — DOCUMENTATION (OUTPATIENT)
Dept: ADMINISTRATIVE | Facility: OTHER | Age: 49
End: 2024-11-05

## 2024-11-05 NOTE — PROGRESS NOTES
Valerie Dockery PA-C  P Patient Reported Team         Blood pressure elevated  Appointment department: [facility]  Appointment provider: Valerie Dockery PA-C  Blood pressure  11/04/24 1415 142/94  11/04/24 1357 142/94    11/05/24 1:59 PM    Patient was called after the Urgent Care visit ; there was no answer/ a message could not be left.    Thank you.  Kevan Dempsey MA  [facility]

## 2024-11-07 LAB
ATRIAL RATE: 73 BPM
P AXIS: 73 DEGREES
PR INTERVAL: 166 MS
QRS AXIS: 70 DEGREES
QRSD INTERVAL: 80 MS
QT INTERVAL: 416 MS
QTC INTERVAL: 458 MS
T WAVE AXIS: 77 DEGREES
VENTRICULAR RATE: 73 BPM

## 2024-11-07 PROCEDURE — 93010 ELECTROCARDIOGRAM REPORT: CPT | Performed by: INTERNAL MEDICINE

## 2025-01-17 ENCOUNTER — HOSPITAL ENCOUNTER (EMERGENCY)
Facility: HOSPITAL | Age: 50
Discharge: HOME | End: 2025-01-17
Attending: EMERGENCY MEDICINE
Payer: COMMERCIAL

## 2025-01-17 ENCOUNTER — APPOINTMENT (EMERGENCY)
Dept: RADIOLOGY | Facility: HOSPITAL | Age: 50
End: 2025-01-17
Payer: COMMERCIAL

## 2025-01-17 VITALS
DIASTOLIC BLOOD PRESSURE: 90 MMHG | OXYGEN SATURATION: 99 % | HEIGHT: 65 IN | SYSTOLIC BLOOD PRESSURE: 166 MMHG | HEART RATE: 115 BPM | RESPIRATION RATE: 18 BRPM | BODY MASS INDEX: 17.83 KG/M2 | TEMPERATURE: 98.2 F | WEIGHT: 107 LBS

## 2025-01-17 DIAGNOSIS — S61.419A HAND LACERATION: Primary | ICD-10-CM

## 2025-01-17 PROCEDURE — 99284 EMERGENCY DEPT VISIT MOD MDM: CPT | Performed by: PHYSICIAN ASSISTANT

## 2025-01-17 PROCEDURE — 73130 X-RAY EXAM OF HAND: CPT

## 2025-01-17 PROCEDURE — 99283 EMERGENCY DEPT VISIT LOW MDM: CPT

## 2025-01-17 PROCEDURE — 12001 RPR S/N/AX/GEN/TRNK 2.5CM/<: CPT | Performed by: PHYSICIAN ASSISTANT

## 2025-01-17 RX ORDER — GINSENG 100 MG
1 CAPSULE ORAL ONCE
Status: COMPLETED | OUTPATIENT
Start: 2025-01-17 | End: 2025-01-17

## 2025-01-17 RX ORDER — LIDOCAINE HYDROCHLORIDE 10 MG/ML
10 INJECTION, SOLUTION EPIDURAL; INFILTRATION; INTRACAUDAL; PERINEURAL ONCE
Status: COMPLETED | OUTPATIENT
Start: 2025-01-17 | End: 2025-01-17

## 2025-01-17 RX ADMIN — BACITRACIN ZINC 1 LARGE APPLICATION: 500 OINTMENT TOPICAL at 09:18

## 2025-01-17 RX ADMIN — LIDOCAINE HYDROCHLORIDE 10 ML: 10 INJECTION, SOLUTION EPIDURAL; INFILTRATION; INTRACAUDAL at 09:18

## 2025-01-17 NOTE — Clinical Note
Nannette Hennessy was seen and treated in our emergency department on 1/17/2025.                Diagnosis:     Nannette  .    She may return on this date:     Nannette Hennessy is excused from work Friday January 17th     If you have any questions or concerns, please don't hesitate to call.      Akil Martínez PA-C    ______________________________           _______________          _______________  Hospital Representative                              Date                                Time

## 2025-01-17 NOTE — DISCHARGE INSTRUCTIONS
Keep sutures dry when bathing    Topical bacitracin ointment daily and new dressing    Suture removal 10 days at your primary care provider    Return to ED for signs of wound infection

## 2025-01-17 NOTE — ED PROVIDER NOTES
ED Disposition       ED Disposition   Discharge    Condition   Stable    Date/Time   Fri Jan 17, 2025 10:11 AM    Comment   Nannette Hennessy discharge to home/self care.                   Assessment & Plan       Medical Decision Making  49-year-old black female with multiple wounds to the right palmar hand.  3 of the 4 wounds are well-approximated and did not require sutures.  The remaining laceration was anesthetized with lido with epi, irrigated with sterile saline, prepped and draped in usual sterile fashion with chlorhexidine, and closed with 3 x 4 0 Ethilon sutures.  Bacitracin and dry sterile dressing applied.  Wound care instructions given.  Suture removal in 10 days at primary care provider or ED.  Return precautions reviewed.   The remaining  3 well-approximated wounds were cleansed thoroughly with chlorhexidine and bacitracin ointment and Band-Aids applied.    Amount and/or Complexity of Data Reviewed  Radiology: ordered.    Risk  OTC drugs.  Prescription drug management.             Medications   lidocaine (PF) (XYLOCAINE-MPF) 1 % injection 10 mL (10 mL Infiltration Given 1/17/25 0918)   bacitracin topical ointment 1 large application (1 large application Topical Given 1/17/25 0918)       ED Risk Strat Scores                          SBIRT 22yo+      Flowsheet Row Most Recent Value   Initial Alcohol Screen: US AUDIT-C     1. How often do you have a drink containing alcohol? 0 Filed at: 01/17/2025 0858   2. How many drinks containing alcohol do you have on a typical day you are drinking?  0 Filed at: 01/17/2025 0858   3a. Male UNDER 65: How often do you have five or more drinks on one occasion? 0 Filed at: 01/17/2025 0858   3b. FEMALE Any Age, or MALE 65+: How often do you have 4 or more drinks on one occassion? 0 Filed at: 01/17/2025 0858   Audit-C Score 0 Filed at: 01/17/2025 0858   FORTUNATO: How many times in the past year have you...    Used an illegal drug or used a prescription medication for non-medical  "reasons? Never Filed at: 01/17/2025 0858                            History of Present Illness       Chief Complaint   Patient presents with    Extremity Laceration     Right wrist cuts from a fall last night        Past Medical History:   Diagnosis Date    Hypertension       History reviewed. No pertinent surgical history.   Family History   Problem Relation Age of Onset    Cancer Mother     Asthma Mother     Diabetes Mother       Social History     Tobacco Use    Smoking status: Every Day     Types: Cigarettes     Passive exposure: Current    Smokeless tobacco: Never    Tobacco comments:     3 per day   Vaping Use    Vaping status: Never Used   Substance Use Topics    Alcohol use: Yes     Comment: \"a few beers a night\"    Drug use: Yes     Types: Marijuana     Comment: \"I smoke a bunch of weed\"      E-Cigarette/Vaping    E-Cigarette Use Never User       E-Cigarette/Vaping Substances    Nicotine No     THC No     CBD No     Flavoring No     Other No     Unknown No       I have reviewed and agree with the history as documented.     Patient is a 49-year-old black female with history of hypertension who reports multiple lacerations to right hand occurring sometime after midnight.  States she was leaving a friend's house walking on a snowy surface when she fell.  States she had had a \"couple beers\" prior to this.  She denies head trauma or LOC.  Denies neck pain or back pain.  Denies any other extremity injury.  Last tetanus was 1/20/2020.  No other complaints.        Review of Systems   Cardiovascular:  Negative for chest pain.   Gastrointestinal:  Negative for abdominal pain.   Musculoskeletal:  Negative for back pain and neck pain.   Skin:  Positive for wound.   Neurological:  Negative for numbness and headaches.           Objective       ED Triage Vitals [01/17/25 0856]   Temperature Pulse Blood Pressure Respirations SpO2 Patient Position - Orthostatic VS   98.2 °F (36.8 °C) (!) 115 166/90 18 99 % --      Temp src " Heart Rate Source BP Location FiO2 (%) Pain Score    -- -- -- -- 9      Vitals      Date and Time Temp Pulse SpO2 Resp BP Pain Score FACES Pain Rating User   01/17/25 0856 98.2 °F (36.8 °C) 115 99 % 18 166/90 9 -- ROGER            Physical Exam  Vitals and nursing note reviewed.   Constitutional:       General: She is not in acute distress.     Appearance: Normal appearance. She is not ill-appearing, toxic-appearing or diaphoretic.   HENT:      Head: Normocephalic and atraumatic.      Right Ear: Tympanic membrane, ear canal and external ear normal.      Left Ear: Tympanic membrane and ear canal normal.      Nose: Nose normal.      Mouth/Throat:      Mouth: Mucous membranes are moist.      Pharynx: Oropharynx is clear.   Eyes:      Extraocular Movements: Extraocular movements intact.      Conjunctiva/sclera: Conjunctivae normal.      Pupils: Pupils are equal, round, and reactive to light.   Cardiovascular:      Rate and Rhythm: Normal rate and regular rhythm.      Pulses: Normal pulses.      Heart sounds: Normal heart sounds.   Pulmonary:      Effort: Pulmonary effort is normal.      Breath sounds: Normal breath sounds.   Abdominal:      General: Abdomen is flat. Bowel sounds are normal.      Palpations: Abdomen is soft.      Tenderness: There is no abdominal tenderness. There is no right CVA tenderness, left CVA tenderness, guarding or rebound.   Musculoskeletal:         General: Normal range of motion.      Cervical back: Normal range of motion and neck supple. No tenderness.   Skin:     General: Skin is warm.      Capillary Refill: Capillary refill takes less than 2 seconds.      Comments: 1.5 cm laceration proximal medial R palmar hand  0.5 cm well approximated superficial lacerations x 3 to flexor R wrist and palmar R hand    Neurological:      General: No focal deficit present.      Mental Status: She is alert and oriented to person, place, and time.         Results Reviewed       None            XR hand 3+ views  "RIGHT    (Results Pending)       Universal Protocol:  procedure performed by consultantConsent: Verbal consent obtained.  Risks and benefits: risks, benefits and alternatives were discussed  Consent given by: patient  Time out: Immediately prior to procedure a \"time out\" was called to verify the correct patient, procedure, equipment, support staff and site/side marked as required.  Timeout called at: 1/17/2025 10:01 AM.  Patient understanding: patient states understanding of the procedure being performed  Patient consent: the patient's understanding of the procedure matches consent given  Procedure consent: procedure consent matches procedure scheduled  Relevant documents: relevant documents present and verified  Test results: test results available and properly labeled  Site marked: the operative site was marked  Radiology Images displayed and confirmed. If images not available, report reviewed: imaging studies available  Patient identity confirmed: verbally with patient and arm band  Laceration repair    Date/Time: 1/17/2025 10:13 AM    Performed by: Akil Martínez PA-C  Authorized by: Akil Martínez PA-C  Body area: upper extremity  Location details: right hand  Laceration length: 1.5 cm  Foreign bodies: no foreign bodies  Tendon involvement: none  Nerve involvement: none  Vascular damage: no  Anesthesia: local infiltration    Anesthesia:  Local Anesthetic: lidocaine 1% without epinephrine  Anesthetic total: 2 mL    Sedation:  Patient sedated: no      Wound Dehiscence:  Superficial Wound Dehiscence: simple closure      Procedure Details:  Preparation: Patient was prepped and draped in the usual sterile fashion.  Irrigation solution: saline  Irrigation method: syringe  Amount of cleaning: standard  Debridement: none  Degree of undermining: none  Skin closure: 4-0 nylon  Number of sutures: 3  Technique: simple  Approximation: loose  Approximation difficulty: simple  Dressing: antibiotic ointment and gauze " roll  Patient tolerance: patient tolerated the procedure well with no immediate complications          ED Medication and Procedure Management   Prior to Admission Medications   Prescriptions Last Dose Informant Patient Reported? Taking?   amLODIPine (NORVASC) 2.5 mg tablet   No No   Sig: Take 2 tablets (5 mg total) by mouth in the morning.   Patient not taking: Reported on 6/13/2023   amLODIPine (NORVASC) 5 mg tablet  Self No No   Sig: Take 1 tablet (5 mg total) by mouth daily   amLODIPine (NORVASC) 5 mg tablet   No No   Sig: Take 1 tablet (5 mg total) by mouth daily   bacitracin topical ointment 500 units/g topical ointment   No No   Sig: Apply 1 large application topically 2 (two) times a day   Patient not taking: Reported on 6/13/2023   chlorthalidone 25 mg tablet   No No   Sig: Take 0.5 tablets (12.5 mg total) by mouth in the morning.   Patient not taking: Reported on 6/13/2023   hydrocortisone 1 % cream   No No   Sig: Apply to affected area 2 times daily   Patient not taking: Reported on 6/13/2023   nicotine (NICODERM CQ) 7 mg/24hr TD 24 hr patch   No No   Sig: Place 1 patch on the skin every 24 hours   Patient not taking: Reported on 5/19/2022   ondansetron (ZOFRAN) 4 mg tablet   No No   Sig: Take 1 tablet (4 mg total) by mouth every 8 (eight) hours as needed for nausea or vomiting   Patient not taking: Reported on 11/4/2024   ondansetron (ZOFRAN-ODT) 4 mg disintegrating tablet   No No   Sig: Take 1 tablet (4 mg total) by mouth every 6 (six) hours as needed for nausea for up to 3 days   pantoprazole (PROTONIX) 20 mg tablet   No No   Sig: Take 1 tablet (20 mg total) by mouth daily   Patient not taking: Reported on 11/4/2024   witch hazel-glycerin (TUCKS) topical pad   No No   Sig: Insert 1 pad into the rectum as needed for irritation   Patient not taking: Reported on 5/19/2022      Facility-Administered Medications: None     Patient's Medications   Discharge Prescriptions    No medications on file     No  discharge procedures on file.  ED SEPSIS DOCUMENTATION            Akil Martínez PA-C  01/17/25 1010